# Patient Record
Sex: FEMALE | Race: WHITE | NOT HISPANIC OR LATINO
[De-identification: names, ages, dates, MRNs, and addresses within clinical notes are randomized per-mention and may not be internally consistent; named-entity substitution may affect disease eponyms.]

---

## 2017-02-01 ENCOUNTER — APPOINTMENT (OUTPATIENT)
Dept: PULMONOLOGY | Facility: CLINIC | Age: 57
End: 2017-02-01

## 2017-02-01 VITALS — SYSTOLIC BLOOD PRESSURE: 118 MMHG | OXYGEN SATURATION: 100 % | HEART RATE: 80 BPM | DIASTOLIC BLOOD PRESSURE: 84 MMHG

## 2017-02-01 VITALS — BODY MASS INDEX: 51.58 KG/M2 | WEIGHT: 282 LBS

## 2017-02-01 DIAGNOSIS — R05 COUGH: ICD-10-CM

## 2017-02-01 DIAGNOSIS — G47.30 SLEEP APNEA, UNSPECIFIED: ICD-10-CM

## 2017-02-17 ENCOUNTER — APPOINTMENT (OUTPATIENT)
Dept: DERMATOLOGY | Facility: CLINIC | Age: 57
End: 2017-02-17

## 2017-03-15 ENCOUNTER — RX RENEWAL (OUTPATIENT)
Age: 57
End: 2017-03-15

## 2017-03-15 ENCOUNTER — OTHER (OUTPATIENT)
Age: 57
End: 2017-03-15

## 2017-06-02 ENCOUNTER — APPOINTMENT (OUTPATIENT)
Dept: ORTHOPEDIC SURGERY | Facility: CLINIC | Age: 57
End: 2017-06-02

## 2017-06-07 ENCOUNTER — TRANSCRIPTION ENCOUNTER (OUTPATIENT)
Age: 57
End: 2017-06-07

## 2017-06-12 ENCOUNTER — APPOINTMENT (OUTPATIENT)
Dept: FAMILY MEDICINE | Facility: CLINIC | Age: 57
End: 2017-06-12

## 2017-06-12 VITALS
HEART RATE: 92 BPM | DIASTOLIC BLOOD PRESSURE: 92 MMHG | OXYGEN SATURATION: 98 % | SYSTOLIC BLOOD PRESSURE: 120 MMHG | WEIGHT: 284 LBS | BODY MASS INDEX: 52.26 KG/M2 | TEMPERATURE: 97.9 F | HEIGHT: 62 IN

## 2017-06-12 RX ORDER — DICLOFENAC SODIUM AND MISOPROSTOL 75; 200 MG/1; UG/1
75-0.2 TABLET, DELAYED RELEASE ORAL
Qty: 90 | Refills: 0 | Status: DISCONTINUED | COMMUNITY
Start: 2017-03-15 | End: 2017-06-12

## 2017-06-22 ENCOUNTER — APPOINTMENT (OUTPATIENT)
Dept: FAMILY MEDICINE | Facility: CLINIC | Age: 57
End: 2017-06-22

## 2017-06-22 VITALS
BODY MASS INDEX: 51.71 KG/M2 | HEART RATE: 61 BPM | OXYGEN SATURATION: 99 % | TEMPERATURE: 98.1 F | WEIGHT: 281 LBS | DIASTOLIC BLOOD PRESSURE: 82 MMHG | SYSTOLIC BLOOD PRESSURE: 122 MMHG | HEIGHT: 62 IN

## 2017-06-22 DIAGNOSIS — J30.9 ALLERGIC RHINITIS, UNSPECIFIED: ICD-10-CM

## 2017-06-25 PROBLEM — J30.9 ALLERGIC RHINITIS: Status: ACTIVE | Noted: 2017-06-25

## 2017-07-06 ENCOUNTER — OTHER (OUTPATIENT)
Age: 57
End: 2017-07-06

## 2017-07-13 ENCOUNTER — APPOINTMENT (OUTPATIENT)
Dept: PULMONOLOGY | Facility: CLINIC | Age: 57
End: 2017-07-13

## 2017-07-21 ENCOUNTER — OTHER (OUTPATIENT)
Age: 57
End: 2017-07-21

## 2017-07-25 ENCOUNTER — APPOINTMENT (OUTPATIENT)
Dept: PULMONOLOGY | Facility: CLINIC | Age: 57
End: 2017-07-25

## 2017-08-02 ENCOUNTER — LABORATORY RESULT (OUTPATIENT)
Age: 57
End: 2017-08-02

## 2017-08-02 ENCOUNTER — NON-APPOINTMENT (OUTPATIENT)
Age: 57
End: 2017-08-02

## 2017-08-02 ENCOUNTER — APPOINTMENT (OUTPATIENT)
Dept: FAMILY MEDICINE | Facility: CLINIC | Age: 57
End: 2017-08-02
Payer: COMMERCIAL

## 2017-08-02 VITALS
HEIGHT: 62 IN | BODY MASS INDEX: 52.26 KG/M2 | WEIGHT: 284 LBS | HEART RATE: 88 BPM | SYSTOLIC BLOOD PRESSURE: 124 MMHG | DIASTOLIC BLOOD PRESSURE: 84 MMHG

## 2017-08-02 DIAGNOSIS — Z80.1 FAMILY HISTORY OF MALIGNANT NEOPLASM OF TRACHEA, BRONCHUS AND LUNG: ICD-10-CM

## 2017-08-02 PROCEDURE — 99396 PREV VISIT EST AGE 40-64: CPT | Mod: 25

## 2017-08-02 PROCEDURE — 36415 COLL VENOUS BLD VENIPUNCTURE: CPT

## 2017-08-02 PROCEDURE — 93000 ELECTROCARDIOGRAM COMPLETE: CPT

## 2017-08-09 ENCOUNTER — RESULT REVIEW (OUTPATIENT)
Age: 57
End: 2017-08-09

## 2017-08-09 ENCOUNTER — APPOINTMENT (OUTPATIENT)
Dept: DERMATOLOGY | Facility: CLINIC | Age: 57
End: 2017-08-09
Payer: COMMERCIAL

## 2017-08-09 PROCEDURE — 99212 OFFICE O/P EST SF 10 MIN: CPT | Mod: 25

## 2017-08-09 PROCEDURE — 67810 INCAL BX EYELID SKN LID MRGN: CPT

## 2017-08-29 LAB
25(OH)D3 SERPL-MCNC: 15.5 NG/ML
ALBUMIN SERPL ELPH-MCNC: 4.1 G/DL
ALP BLD-CCNC: 106 U/L
ALT SERPL-CCNC: 12 U/L
ANION GAP SERPL CALC-SCNC: 16 MMOL/L
APPEARANCE: CLEAR
AST SERPL-CCNC: 18 U/L
BASOPHILS # BLD AUTO: 0.01 K/UL
BASOPHILS NFR BLD AUTO: 0.1 %
BILIRUB SERPL-MCNC: 0.4 MG/DL
BILIRUBIN URINE: NEGATIVE
BLOOD URINE: NEGATIVE
BUN SERPL-MCNC: 7 MG/DL
CALCIUM SERPL-MCNC: 9.6 MG/DL
CHLORIDE SERPL-SCNC: 103 MMOL/L
CHOLEST SERPL-MCNC: 196 MG/DL
CHOLEST/HDLC SERPL: 2.5 RATIO
CO2 SERPL-SCNC: 23 MMOL/L
COLOR: YELLOW
CREAT SERPL-MCNC: 0.79 MG/DL
EOSINOPHIL # BLD AUTO: 0.18 K/UL
EOSINOPHIL NFR BLD AUTO: 2.5 %
GLUCOSE QUALITATIVE U: NORMAL MG/DL
GLUCOSE SERPL-MCNC: 89 MG/DL
HCT VFR BLD CALC: 35.4 %
HCV AB SER QL: NONREACTIVE
HCV S/CO RATIO: 0.26 S/CO
HDLC SERPL-MCNC: 80 MG/DL
HGB BLD-MCNC: 10.5 G/DL
HIV1+2 AB SPEC QL IA.RAPID: NONREACTIVE
IMM GRANULOCYTES NFR BLD AUTO: 0.1 %
KETONES URINE: NEGATIVE
LDLC SERPL CALC-MCNC: 101 MG/DL
LEUKOCYTE ESTERASE URINE: ABNORMAL
LYMPHOCYTES # BLD AUTO: 2.13 K/UL
LYMPHOCYTES NFR BLD AUTO: 29.7 %
MAN DIFF?: NORMAL
MCHC RBC-ENTMCNC: 26.2 PG
MCHC RBC-ENTMCNC: 29.7 GM/DL
MCV RBC AUTO: 88.3 FL
MONOCYTES # BLD AUTO: 0.5 K/UL
MONOCYTES NFR BLD AUTO: 7 %
NEUTROPHILS # BLD AUTO: 4.35 K/UL
NEUTROPHILS NFR BLD AUTO: 60.6 %
NITRITE URINE: NEGATIVE
PH URINE: 7
PLATELET # BLD AUTO: 365 K/UL
POTASSIUM SERPL-SCNC: 4.4 MMOL/L
PROT SERPL-MCNC: 7.9 G/DL
PROTEIN URINE: NEGATIVE MG/DL
RBC # BLD: 4.01 M/UL
RBC # FLD: 19.3 %
SODIUM SERPL-SCNC: 142 MMOL/L
SPECIFIC GRAVITY URINE: 1
T4 FREE SERPL-MCNC: 1.2 NG/DL
TRIGL SERPL-MCNC: 74 MG/DL
TSH SERPL-ACNC: 1.65 UIU/ML
UROBILINOGEN URINE: NORMAL MG/DL
VIT B12 SERPL-MCNC: 773 PG/ML
WBC # FLD AUTO: 7.18 K/UL

## 2017-09-02 ENCOUNTER — LABORATORY RESULT (OUTPATIENT)
Age: 57
End: 2017-09-02

## 2017-09-05 LAB — HEMOCCULT STL QL IA: NEGATIVE

## 2017-09-06 ENCOUNTER — APPOINTMENT (OUTPATIENT)
Dept: FAMILY MEDICINE | Facility: CLINIC | Age: 57
End: 2017-09-06
Payer: COMMERCIAL

## 2017-09-06 VITALS
BODY MASS INDEX: 53.18 KG/M2 | WEIGHT: 289 LBS | SYSTOLIC BLOOD PRESSURE: 118 MMHG | HEART RATE: 68 BPM | DIASTOLIC BLOOD PRESSURE: 82 MMHG | HEIGHT: 62 IN

## 2017-09-06 PROCEDURE — 99214 OFFICE O/P EST MOD 30 MIN: CPT | Mod: 25

## 2017-09-06 PROCEDURE — 36415 COLL VENOUS BLD VENIPUNCTURE: CPT

## 2017-09-06 PROCEDURE — 90715 TDAP VACCINE 7 YRS/> IM: CPT

## 2017-09-06 PROCEDURE — 90471 IMMUNIZATION ADMIN: CPT

## 2017-09-08 ENCOUNTER — OTHER (OUTPATIENT)
Age: 57
End: 2017-09-08

## 2017-09-08 LAB
FERRITIN SERPL-MCNC: 12 NG/ML
HGB A MFR BLD: 97.7 %
HGB A2 MFR BLD: 2.3 %
HGB FRACT BLD-IMP: NORMAL
IRON SATN MFR SERPL: 7 %
IRON SERPL-MCNC: 27 UG/DL
TIBC SERPL-MCNC: 373 UG/DL
UIBC SERPL-MCNC: 346 UG/DL

## 2017-10-29 ENCOUNTER — RX RENEWAL (OUTPATIENT)
Age: 57
End: 2017-10-29

## 2018-02-14 ENCOUNTER — APPOINTMENT (OUTPATIENT)
Dept: DERMATOLOGY | Facility: CLINIC | Age: 58
End: 2018-02-14
Payer: COMMERCIAL

## 2018-02-14 PROCEDURE — 99213 OFFICE O/P EST LOW 20 MIN: CPT

## 2018-03-28 ENCOUNTER — APPOINTMENT (OUTPATIENT)
Dept: ORTHOPEDIC SURGERY | Facility: CLINIC | Age: 58
End: 2018-03-28
Payer: COMMERCIAL

## 2018-03-28 VITALS
DIASTOLIC BLOOD PRESSURE: 85 MMHG | WEIGHT: 289 LBS | HEART RATE: 98 BPM | HEIGHT: 62 IN | SYSTOLIC BLOOD PRESSURE: 147 MMHG | BODY MASS INDEX: 53.18 KG/M2

## 2018-03-28 PROCEDURE — 73562 X-RAY EXAM OF KNEE 3: CPT | Mod: LT

## 2018-03-28 PROCEDURE — 99214 OFFICE O/P EST MOD 30 MIN: CPT

## 2018-06-18 ENCOUNTER — APPOINTMENT (OUTPATIENT)
Dept: FAMILY MEDICINE | Facility: CLINIC | Age: 58
End: 2018-06-18
Payer: MEDICAID

## 2018-06-18 VITALS
DIASTOLIC BLOOD PRESSURE: 80 MMHG | WEIGHT: 289 LBS | HEIGHT: 62 IN | SYSTOLIC BLOOD PRESSURE: 118 MMHG | HEART RATE: 98 BPM | BODY MASS INDEX: 53.18 KG/M2

## 2018-06-18 DIAGNOSIS — D64.9 ANEMIA, UNSPECIFIED: ICD-10-CM

## 2018-06-18 DIAGNOSIS — L98.9 DISORDER OF THE SKIN AND SUBCUTANEOUS TISSUE, UNSPECIFIED: ICD-10-CM

## 2018-06-18 PROCEDURE — 99214 OFFICE O/P EST MOD 30 MIN: CPT

## 2018-06-18 RX ORDER — ERGOCALCIFEROL 1.25 MG/1
1.25 MG CAPSULE, LIQUID FILLED ORAL
Qty: 8 | Refills: 0 | Status: DISCONTINUED | COMMUNITY
Start: 2017-09-06 | End: 2018-06-18

## 2018-06-18 NOTE — REVIEW OF SYSTEMS
[Itching] : Itching [Fever] : no fever [Chills] : no chills [Chest Pain] : no chest pain [Palpitations] : no palpitations [Shortness Of Breath] : no shortness of breath [Cough] : no cough [Abdominal Pain] : no abdominal pain [Nausea] : no nausea [Diarrhea] : diarrhea [Vomiting] : no vomiting [Melena] : no melena [Dysuria] : no dysuria [Hematuria] : no hematuria [Mole Changes] : no mole changes [Headache] : no headache [Dizziness] : no dizziness [Fainting] : no fainting

## 2018-06-18 NOTE — HISTORY OF PRESENT ILLNESS
[FreeTextEntry8] : PRESENTING FOR ACUTE VISIT.  WENT TO URGENT CARE 10 DAYS AGO FOR BLISTERS ON HANDS AND FOREARMS.  WAS ITCHING.  NO PAIN.  GIVEN TRIAMCINOLONE CREAM AND PREDNISONE TABS FOR FOUR DAYS.  NO GARDENING OR PLANT EXPOSURE.  NO NEW FOODS OR LOTIONS.  NO SICK CONTACTS.  SYMPTOMS RESOLVED AND THEN ANOTHER SPOT ON SATURDAY LEFT MIDDLE FINGER AND RING FINGER.  NOW ALSO ON LEFT CHEEK.  HAS APPOINTMENT WITH DERMATOLOGY TOMORROW.  NO FEVER.  HAS CLEAR DISCHARGE FROM ONE SPOT ON FINGER.  IS TO SEE GYN NEXT WEEK.  HISTORY OF GALLBLADDER POLYP - DUE FOR REPEAT ABDOMINAL SONOGRAM.  HAS HAD NO ABDOMINAL PAIN OR N/V/D.  NO BLOOD IN STOOL.  TAKING B12 AND VITAMIN D SUPPLEMENTS FOR VITAMIN B12 DEFICIENCY AND VITAMIN D INSUFFICIENCY AND IRON FOR PRIOR ANEMIA.

## 2018-06-18 NOTE — PHYSICAL EXAM
[No Acute Distress] : no acute distress [Well Nourished] : well nourished [Well-Appearing] : well-appearing [Supple] : supple [No Lymphadenopathy] : no lymphadenopathy [No Respiratory Distress] : no respiratory distress  [Clear to Auscultation] : lungs were clear to auscultation bilaterally [No Accessory Muscle Use] : no accessory muscle use [Normal Rate] : normal rate  [Regular Rhythm] : with a regular rhythm [Normal S1, S2] : normal S1 and S2 [No Murmur] : no murmur heard [Soft] : abdomen soft [Non Tender] : non-tender [Normal Bowel Sounds] : normal bowel sounds [Normal Gait] : normal gait [No Focal Deficits] : no focal deficits [Acne] : no acne [de-identified] : LEFT MIDDLE FINGER WITH SMALL BLISTER LIKE LESION, SCABBING NOTED ON LEFT RING FINGER AND BACK.  INFLAMMATION TO A FEW SPOTS ON LEFT CHEEK ? SCRATCHED

## 2018-06-18 NOTE — PLAN
[FreeTextEntry1] : KEEP APPOINTMENT WITH DERMATOLOGY TOMORROW MORNING\par KEEP SKIN CLEAN AND DRY\par TRY TO AVOID SCRATCHING\par CONTINUE WITH TRIAMCINOLONE AT THIS TIME\par MONITOR FOR POTENTIAL TRIGGERS OF SYMPTOMS\par RX FOR NEXT ABDOMINAL SONOGRAM IN FOLLOW-UP OF GALLBLADDER POLYP\par CHECK LAB WORK INCLUDING CBC, IRON LEVELS, B12 AND D\par FOLLOW-UP ALL SPECIALISTS AS DIRECTED \par CALL WITH ANY QUESTIONS, CONCERNS OR CHANGES

## 2018-06-19 ENCOUNTER — APPOINTMENT (OUTPATIENT)
Dept: DERMATOLOGY | Facility: CLINIC | Age: 58
End: 2018-06-19
Payer: COMMERCIAL

## 2018-06-19 PROCEDURE — 99213 OFFICE O/P EST LOW 20 MIN: CPT

## 2018-06-28 LAB
25(OH)D3 SERPL-MCNC: 26.8 NG/ML
BASOPHILS # BLD AUTO: 0.02 K/UL
BASOPHILS NFR BLD AUTO: 0.2 %
EOSINOPHIL # BLD AUTO: 0.15 K/UL
EOSINOPHIL NFR BLD AUTO: 1.7 %
FERRITIN SERPL-MCNC: 29 NG/ML
HCT VFR BLD CALC: 38.5 %
HGB BLD-MCNC: 11.9 G/DL
IMM GRANULOCYTES NFR BLD AUTO: 0.2 %
IRON SATN MFR SERPL: 17 %
IRON SERPL-MCNC: 63 UG/DL
LYMPHOCYTES # BLD AUTO: 2.05 K/UL
LYMPHOCYTES NFR BLD AUTO: 23.6 %
MAN DIFF?: NORMAL
MCHC RBC-ENTMCNC: 28.4 PG
MCHC RBC-ENTMCNC: 30.9 GM/DL
MCV RBC AUTO: 91.9 FL
MONOCYTES # BLD AUTO: 0.62 K/UL
MONOCYTES NFR BLD AUTO: 7.2 %
NEUTROPHILS # BLD AUTO: 5.81 K/UL
NEUTROPHILS NFR BLD AUTO: 67.1 %
PLATELET # BLD AUTO: 330 K/UL
RBC # BLD: 4.19 M/UL
RBC # FLD: 17.3 %
TIBC SERPL-MCNC: 365 UG/DL
UIBC SERPL-MCNC: 302 UG/DL
VIT B12 SERPL-MCNC: 1231 PG/ML
WBC # FLD AUTO: 8.67 K/UL

## 2018-07-27 ENCOUNTER — RESULT REVIEW (OUTPATIENT)
Age: 58
End: 2018-07-27

## 2018-08-01 ENCOUNTER — RESULT REVIEW (OUTPATIENT)
Age: 58
End: 2018-08-01

## 2018-08-01 ENCOUNTER — APPOINTMENT (OUTPATIENT)
Dept: DERMATOLOGY | Facility: CLINIC | Age: 58
End: 2018-08-01
Payer: MEDICAID

## 2018-08-01 PROCEDURE — 99213 OFFICE O/P EST LOW 20 MIN: CPT | Mod: 25

## 2018-08-01 PROCEDURE — 11100 BX SKIN SUBCUTANEOUS&/MUCOUS MEMBRANE 1 LESION: CPT

## 2018-08-13 ENCOUNTER — APPOINTMENT (OUTPATIENT)
Dept: DERMATOLOGY | Facility: CLINIC | Age: 58
End: 2018-08-13
Payer: MEDICAID

## 2018-08-13 PROCEDURE — 99213 OFFICE O/P EST LOW 20 MIN: CPT

## 2018-08-20 ENCOUNTER — APPOINTMENT (OUTPATIENT)
Dept: FAMILY MEDICINE | Facility: CLINIC | Age: 58
End: 2018-08-20
Payer: MEDICAID

## 2018-08-20 ENCOUNTER — NON-APPOINTMENT (OUTPATIENT)
Age: 58
End: 2018-08-20

## 2018-08-20 ENCOUNTER — APPOINTMENT (OUTPATIENT)
Dept: PULMONOLOGY | Facility: CLINIC | Age: 58
End: 2018-08-20
Payer: MEDICAID

## 2018-08-20 VITALS
WEIGHT: 289 LBS | SYSTOLIC BLOOD PRESSURE: 120 MMHG | BODY MASS INDEX: 53.18 KG/M2 | HEIGHT: 62 IN | HEART RATE: 117 BPM | DIASTOLIC BLOOD PRESSURE: 70 MMHG | OXYGEN SATURATION: 96 %

## 2018-08-20 VITALS
BODY MASS INDEX: 53.18 KG/M2 | WEIGHT: 289 LBS | DIASTOLIC BLOOD PRESSURE: 70 MMHG | SYSTOLIC BLOOD PRESSURE: 120 MMHG | HEART RATE: 84 BPM | HEIGHT: 62 IN

## 2018-08-20 DIAGNOSIS — Z23 ENCOUNTER FOR IMMUNIZATION: ICD-10-CM

## 2018-08-20 DIAGNOSIS — Z96.652 AFTERCARE FOLLOWING JOINT REPLACEMENT SURGERY: ICD-10-CM

## 2018-08-20 DIAGNOSIS — K76.0 FATTY (CHANGE OF) LIVER, NOT ELSEWHERE CLASSIFIED: ICD-10-CM

## 2018-08-20 DIAGNOSIS — Z47.1 AFTERCARE FOLLOWING JOINT REPLACEMENT SURGERY: ICD-10-CM

## 2018-08-20 DIAGNOSIS — M25.473 EFFUSION, UNSPECIFIED ANKLE: ICD-10-CM

## 2018-08-20 DIAGNOSIS — S61.432A PUNCTURE WOUND W/OUT FOREIGN BODY OF LEFT HAND, INITIAL ENCOUNTER: ICD-10-CM

## 2018-08-20 DIAGNOSIS — Z87.09 PERSONAL HISTORY OF OTHER DISEASES OF THE RESPIRATORY SYSTEM: ICD-10-CM

## 2018-08-20 PROCEDURE — 99396 PREV VISIT EST AGE 40-64: CPT | Mod: 25

## 2018-08-20 PROCEDURE — 93000 ELECTROCARDIOGRAM COMPLETE: CPT

## 2018-08-20 PROCEDURE — 99214 OFFICE O/P EST MOD 30 MIN: CPT

## 2018-08-20 NOTE — PLAN
[FreeTextEntry1] : HEALTHY DIET AND LIFESTYLE MODIFICATIONS\par HEALTHY WEIGHT LOSS \par VISION SCREENING\par FLU VACCINE IN THE FALL RECOMMENDED\par DISCUSSED SHINGLES VACCINE\par CHECK ROUTINE LAB WORK\par STOOL OCCULT\par UP TO DATE WITH GYN, MAMMOGRAM AND COLONOSCOPY\par FOLLOW-UP ALL SPECIALISTS AS DIRECTED INCLUDING DERMATOLOGY\par EKG: NSR AT 64 BPM, NO ACUTE ST-T WAVE CHANGES, NO SIGNIFICANT CHANGE FROM PRIOR\par CALL WITH ANY QUESTIONS, CONCERNS OR CHANGES

## 2018-08-20 NOTE — HISTORY OF PRESENT ILLNESS
[FreeTextEntry1] : WELLNESS EXAM [de-identified] : PRESENTING FOR YEARLY WELLNESS EXAM.  DIET HAS IMPROVED.  PORTIONS AND CHOICES BETTER.  NOT ROUTINELY EXERCISING.  WOULD LIKE TO LOOSE WEIGHT.  \par DERMATOLOGY: DR. WALLACE/DARRELL.  BEING EVALUATED FOR DERMATITIS OF SKIN.  ORIGINALLY PUT ON PREDNISONE BY DR. RAMÍREZ.  RECURRED AND SAW DR. WOODALL.  GIVEN MULTIPLE LOTIONS.  HAS FOLLOW-UP APPOINTMENT ON SATURDAY.  ADVISED POSSIBLE SCABIES.  HAD SKIN BIOPSY AND DISCUSSED ADDITIONAL SKIN TESTING.\par PULMONOLOGY: DR. WREN - TO FOLLOW-UP TODAY.  ON CPAP\par GYN: DR. DONATO\par GI: DR. RIVERA\par OPHTHALMOLOGY: DR. FRANCE\par CARDIOLOGY: DR. VEE\par ORTHOPEDICS: DR. GONZALEZ\par RHEUMATOLOGY: DR. SIMMS\par  HAS HAD NO HEADACHE, DIZZINESS, CHEST PAIN, SHORTNESS OF BREATH, GI OR URINARY COMPLAINTS.

## 2018-08-20 NOTE — REVIEW OF SYSTEMS
[Joint Pain] : joint pain [Itching] : Itching [Fever] : no fever [Chills] : no chills [Fatigue] : no fatigue [Discharge] : no discharge [Pain] : no pain [Earache] : no earache [Nasal Discharge] : no nasal discharge [Sore Throat] : no sore throat [Chest Pain] : no chest pain [Palpitations] : no palpitations [Shortness Of Breath] : no shortness of breath [Cough] : no cough [Abdominal Pain] : no abdominal pain [Nausea] : no nausea [Diarrhea] : diarrhea [Vomiting] : no vomiting [Melena] : no melena [Dysuria] : no dysuria [Hematuria] : no hematuria [Muscle Pain] : no muscle pain [Headache] : no headache [Dizziness] : no dizziness [Fainting] : no fainting [Suicidal] : not suicidal [Depression] : no depression [Easy Bleeding] : no easy bleeding [Easy Bruising] : no easy bruising

## 2018-08-20 NOTE — HEALTH RISK ASSESSMENT
[Patient reported colonoscopy was normal] : Patient reported colonoscopy was normal [Excellent] : ~his/her~ current health as excellent [Good] : ~his/her~  mood as  good [0] : 2) Feeling down, depressed, or hopeless: Not at all (0) [None] : None [Alone] : lives alone [Employed] : employed [College] : College [Single] : single [Feels Safe at Home] : Feels safe at home [Fully functional (bathing, dressing, toileting, transferring, walking, feeding)] : Fully functional (bathing, dressing, toileting, transferring, walking, feeding) [Fully functional (using the telephone, shopping, preparing meals, housekeeping, doing laundry, using] : Fully functional and needs no help or supervision to perform IADLs (using the telephone, shopping, preparing meals, housekeeping, doing laundry, using transportation, managing medications and managing finances) [Smoke Detector] : smoke detector [Carbon Monoxide Detector] : carbon monoxide detector [Safety elements used in home] : safety elements used in home [Seat Belt] :  uses seat belt [Sunscreen] : uses sunscreen [Discussed at today's visit] : Advance Directives Discussed at today's visit [] : No [de-identified] : SOCIALLY [[id number]] : 0 [Change in mental status noted] : No change in mental status noted [Language] : denies difficulty with language [Learning/Retaining New Information] : denies difficulty learning/retaining new information [Handling Complex Tasks] : denies difficulty handling complex tasks [High Risk Behavior] : no high risk behavior [Reports changes in hearing] : Reports no changes in hearing [Reports changes in vision] : Reports no changes in vision [Reports changes in dental health] : Reports no changes in dental health [MammogramDate] : 07/18 [PapSmearDate] : 06/18 [BoneDensityDate] : 02/14 [BoneDensityComments] : OSTEOPENIA - DR. SIMMS [ColonoscopyDate] : 02/16 [ColonoscopyComments] : THREE YEAR FOLLOW-UP ADVISED [HIVDate] : 08/17 [HepatitisCDate] : 08/17 [de-identified] : GLASSES FOR DISTANCE AND READING [de-identified] : SEES DENTIST ROUTINELY

## 2018-08-20 NOTE — PHYSICAL EXAM
[No Acute Distress] : no acute distress [Well Nourished] : well nourished [Well-Appearing] : well-appearing [Normal Sclera/Conjunctiva] : normal sclera/conjunctiva [PERRL] : pupils equal round and reactive to light [EOMI] : extraocular movements intact [Normal Outer Ear/Nose] : the outer ears and nose were normal in appearance [Normal Oropharynx] : the oropharynx was normal [Normal TMs] : both tympanic membranes were normal [Supple] : supple [No Lymphadenopathy] : no lymphadenopathy [No Respiratory Distress] : no respiratory distress  [Clear to Auscultation] : lungs were clear to auscultation bilaterally [No Accessory Muscle Use] : no accessory muscle use [Normal Rate] : normal rate  [Regular Rhythm] : with a regular rhythm [Normal S1, S2] : normal S1 and S2 [No Murmur] : no murmur heard [Soft] : abdomen soft [Non Tender] : non-tender [Normal Bowel Sounds] : normal bowel sounds [No Joint Swelling] : no joint swelling [Grossly Normal Strength/Tone] : grossly normal strength/tone [Normal Gait] : normal gait [Coordination Grossly Intact] : coordination grossly intact [No Focal Deficits] : no focal deficits [Normal Affect] : the affect was normal [Normal Mood] : the mood was normal [Normal Insight/Judgement] : insight and judgment were intact [Acne] : no acne [de-identified] : UPPER ARMS, ABDOMEN AND BACK WITH EXCORIATIONS AND ERYTHEMATOUS PAPULES SCATTERED

## 2018-08-25 ENCOUNTER — APPOINTMENT (OUTPATIENT)
Dept: DERMATOLOGY | Facility: CLINIC | Age: 58
End: 2018-08-25
Payer: MEDICAID

## 2018-08-25 PROCEDURE — 99214 OFFICE O/P EST MOD 30 MIN: CPT

## 2018-08-28 ENCOUNTER — APPOINTMENT (OUTPATIENT)
Dept: DERMATOLOGY | Facility: CLINIC | Age: 58
End: 2018-08-28

## 2018-08-30 ENCOUNTER — APPOINTMENT (OUTPATIENT)
Dept: DERMATOLOGY | Facility: CLINIC | Age: 58
End: 2018-08-30

## 2018-08-31 ENCOUNTER — LABORATORY RESULT (OUTPATIENT)
Age: 58
End: 2018-08-31

## 2018-09-01 ENCOUNTER — APPOINTMENT (OUTPATIENT)
Dept: DERMATOLOGY | Facility: CLINIC | Age: 58
End: 2018-09-01

## 2018-09-04 ENCOUNTER — TRANSCRIPTION ENCOUNTER (OUTPATIENT)
Age: 58
End: 2018-09-04

## 2018-09-04 LAB
ALBUMIN SERPL ELPH-MCNC: 3.8 G/DL
ALP BLD-CCNC: 89 U/L
ALT SERPL-CCNC: 16 U/L
ANION GAP SERPL CALC-SCNC: 12 MMOL/L
APPEARANCE: CLEAR
AST SERPL-CCNC: 13 U/L
BILIRUB SERPL-MCNC: 0.3 MG/DL
BILIRUBIN URINE: NEGATIVE
BLOOD URINE: NEGATIVE
BUN SERPL-MCNC: 8 MG/DL
CALCIUM SERPL-MCNC: 10.2 MG/DL
CHLORIDE SERPL-SCNC: 102 MMOL/L
CHOLEST SERPL-MCNC: 221 MG/DL
CHOLEST/HDLC SERPL: 2.5 RATIO
CO2 SERPL-SCNC: 30 MMOL/L
COLOR: YELLOW
CREAT SERPL-MCNC: 0.85 MG/DL
GLUCOSE QUALITATIVE U: NEGATIVE MG/DL
GLUCOSE SERPL-MCNC: 78 MG/DL
HBA1C MFR BLD HPLC: 5.6 %
HDLC SERPL-MCNC: 90 MG/DL
KETONES URINE: NEGATIVE
LDLC SERPL CALC-MCNC: 100 MG/DL
LEUKOCYTE ESTERASE URINE: ABNORMAL
NITRITE URINE: NEGATIVE
PH URINE: 6.5
POTASSIUM SERPL-SCNC: 4.3 MMOL/L
PROT SERPL-MCNC: 7.1 G/DL
PROTEIN URINE: NEGATIVE MG/DL
SODIUM SERPL-SCNC: 144 MMOL/L
SPECIFIC GRAVITY URINE: 1.01
T4 FREE SERPL-MCNC: 1.3 NG/DL
TRIGL SERPL-MCNC: 155 MG/DL
TSH SERPL-ACNC: 2.53 UIU/ML
UROBILINOGEN URINE: NEGATIVE MG/DL

## 2018-09-06 ENCOUNTER — TRANSCRIPTION ENCOUNTER (OUTPATIENT)
Age: 58
End: 2018-09-06

## 2018-09-08 ENCOUNTER — APPOINTMENT (OUTPATIENT)
Dept: DERMATOLOGY | Facility: CLINIC | Age: 58
End: 2018-09-08
Payer: MEDICAID

## 2018-09-08 ENCOUNTER — APPOINTMENT (OUTPATIENT)
Dept: DERMATOLOGY | Facility: CLINIC | Age: 58
End: 2018-09-08

## 2018-09-08 ENCOUNTER — RESULT REVIEW (OUTPATIENT)
Age: 58
End: 2018-09-08

## 2018-09-08 PROCEDURE — 11100 BX SKIN SUBCUTANEOUS&/MUCOUS MEMBRANE 1 LESION: CPT

## 2018-09-10 ENCOUNTER — TRANSCRIPTION ENCOUNTER (OUTPATIENT)
Age: 58
End: 2018-09-10

## 2018-09-11 ENCOUNTER — TRANSCRIPTION ENCOUNTER (OUTPATIENT)
Age: 58
End: 2018-09-11

## 2018-09-11 ENCOUNTER — APPOINTMENT (OUTPATIENT)
Dept: DERMATOLOGY | Facility: CLINIC | Age: 58
End: 2018-09-11
Payer: MEDICAID

## 2018-09-11 VITALS
BODY MASS INDEX: 52.44 KG/M2 | HEIGHT: 62 IN | WEIGHT: 285 LBS | SYSTOLIC BLOOD PRESSURE: 110 MMHG | DIASTOLIC BLOOD PRESSURE: 72 MMHG

## 2018-09-11 DIAGNOSIS — F41.8 OTHER SPECIFIED ANXIETY DISORDERS: ICD-10-CM

## 2018-09-11 DIAGNOSIS — Z87.39 PERSONAL HISTORY OF OTHER DISEASES OF THE MUSCULOSKELETAL SYSTEM AND CONNECTIVE TISSUE: ICD-10-CM

## 2018-09-11 DIAGNOSIS — Z80.8 FAMILY HISTORY OF MALIGNANT NEOPLASM OF OTHER ORGANS OR SYSTEMS: ICD-10-CM

## 2018-09-11 DIAGNOSIS — Z87.898 PERSONAL HISTORY OF OTHER SPECIFIED CONDITIONS: ICD-10-CM

## 2018-09-11 PROCEDURE — 99213 OFFICE O/P EST LOW 20 MIN: CPT

## 2018-09-12 ENCOUNTER — TRANSCRIPTION ENCOUNTER (OUTPATIENT)
Age: 58
End: 2018-09-12

## 2018-09-19 ENCOUNTER — APPOINTMENT (OUTPATIENT)
Dept: DERMATOLOGY | Facility: CLINIC | Age: 58
End: 2018-09-19
Payer: MEDICAID

## 2018-09-19 ENCOUNTER — TRANSCRIPTION ENCOUNTER (OUTPATIENT)
Age: 58
End: 2018-09-19

## 2018-09-19 ENCOUNTER — LABORATORY RESULT (OUTPATIENT)
Age: 58
End: 2018-09-19

## 2018-09-19 VITALS
DIASTOLIC BLOOD PRESSURE: 80 MMHG | WEIGHT: 285 LBS | BODY MASS INDEX: 52.44 KG/M2 | HEIGHT: 62 IN | SYSTOLIC BLOOD PRESSURE: 110 MMHG

## 2018-09-19 DIAGNOSIS — R76.8 OTHER SPECIFIED ABNORMAL IMMUNOLOGICAL FINDINGS IN SERUM: ICD-10-CM

## 2018-09-19 DIAGNOSIS — D48.5 NEOPLASM OF UNCERTAIN BEHAVIOR OF SKIN: ICD-10-CM

## 2018-09-19 PROCEDURE — 99213 OFFICE O/P EST LOW 20 MIN: CPT | Mod: 25

## 2018-09-19 PROCEDURE — 11300 SHAVE SKIN LESION 0.5 CM/<: CPT

## 2018-09-20 ENCOUNTER — TRANSCRIPTION ENCOUNTER (OUTPATIENT)
Age: 58
End: 2018-09-20

## 2018-09-20 ENCOUNTER — LABORATORY RESULT (OUTPATIENT)
Age: 58
End: 2018-09-20

## 2018-09-21 LAB
ANA PAT FLD IF-IMP: ABNORMAL
ANA SER IF-ACNC: ABNORMAL
DSDNA AB SER-ACNC: <12 IU/ML

## 2018-09-27 LAB
DESMOGLEIN 1 AB SER-ACNC: 1.92 U
DESMOGLEIN 3 AB SER-ACNC: 0.79 U
MISCELLANEOUS TEST: NORMAL
PROC NAME: NORMAL

## 2018-10-02 ENCOUNTER — TRANSCRIPTION ENCOUNTER (OUTPATIENT)
Age: 58
End: 2018-10-02

## 2018-10-02 LAB — HEMOCCULT STL QL IA: NEGATIVE

## 2018-10-03 ENCOUNTER — TRANSCRIPTION ENCOUNTER (OUTPATIENT)
Age: 58
End: 2018-10-03

## 2018-10-11 ENCOUNTER — APPOINTMENT (OUTPATIENT)
Dept: RHEUMATOLOGY | Facility: CLINIC | Age: 58
End: 2018-10-11
Payer: MEDICAID

## 2018-10-11 VITALS
HEART RATE: 102 BPM | HEIGHT: 62 IN | WEIGHT: 285 LBS | OXYGEN SATURATION: 97 % | TEMPERATURE: 97.3 F | DIASTOLIC BLOOD PRESSURE: 79 MMHG | BODY MASS INDEX: 52.44 KG/M2 | SYSTOLIC BLOOD PRESSURE: 116 MMHG

## 2018-10-11 PROCEDURE — 99204 OFFICE O/P NEW MOD 45 MIN: CPT

## 2018-10-12 LAB
25(OH)D3 SERPL-MCNC: 23.1 NG/ML
APTT BLD: 25.6 SEC
B2 GLYCOPROT1 AB SER QL: NEGATIVE
C3 SERPL-MCNC: 179 MG/DL
C4 SERPL-MCNC: 34 MG/DL
CARDIOLIPIN AB SER IA-ACNC: NEGATIVE
INR PPP: 0.98 RATIO
PT BLD: 11.1 SEC
THYROGLOB AB SERPL-ACNC: <20 IU/ML
THYROPEROXIDASE AB SERPL IA-ACNC: <10 IU/ML

## 2018-10-13 LAB
B2 GLYCOPROT1 IGA SERPL IA-ACNC: <5 SAU
ENA SS-A AB SER IA-ACNC: <0.2 AL
ENA SS-B AB SER IA-ACNC: <0.2 AL

## 2018-10-14 LAB — G6PD SER-CCNC: 16.7 U/G HGB

## 2018-10-16 ENCOUNTER — APPOINTMENT (OUTPATIENT)
Dept: DERMATOLOGY | Facility: CLINIC | Age: 58
End: 2018-10-16

## 2018-10-19 ENCOUNTER — TRANSCRIPTION ENCOUNTER (OUTPATIENT)
Age: 58
End: 2018-10-19

## 2018-10-23 ENCOUNTER — MEDICATION RENEWAL (OUTPATIENT)
Age: 58
End: 2018-10-23

## 2018-10-23 ENCOUNTER — TRANSCRIPTION ENCOUNTER (OUTPATIENT)
Age: 58
End: 2018-10-23

## 2018-10-23 ENCOUNTER — APPOINTMENT (OUTPATIENT)
Dept: PULMONOLOGY | Facility: CLINIC | Age: 58
End: 2018-10-23

## 2018-10-30 ENCOUNTER — APPOINTMENT (OUTPATIENT)
Dept: DERMATOLOGY | Facility: CLINIC | Age: 58
End: 2018-10-30
Payer: MEDICAID

## 2018-10-30 VITALS
HEIGHT: 62 IN | WEIGHT: 285 LBS | DIASTOLIC BLOOD PRESSURE: 70 MMHG | BODY MASS INDEX: 52.44 KG/M2 | TEMPERATURE: 97.7 F | SYSTOLIC BLOOD PRESSURE: 106 MMHG

## 2018-10-30 DIAGNOSIS — Z82.61 FAMILY HISTORY OF ARTHRITIS: ICD-10-CM

## 2018-10-30 PROCEDURE — 99213 OFFICE O/P EST LOW 20 MIN: CPT

## 2018-10-31 PROBLEM — Z82.61 FAMILY HISTORY OF ARTHRITIS: Status: ACTIVE | Noted: 2018-10-11

## 2018-11-16 ENCOUNTER — APPOINTMENT (OUTPATIENT)
Dept: DERMATOLOGY | Facility: CLINIC | Age: 58
End: 2018-11-16
Payer: MEDICAID

## 2018-11-16 VITALS — BODY MASS INDEX: 52.44 KG/M2 | WEIGHT: 285 LBS | HEIGHT: 62 IN

## 2018-11-16 PROCEDURE — 99214 OFFICE O/P EST MOD 30 MIN: CPT | Mod: GC

## 2018-11-16 RX ORDER — GLUC/MSM/COLGN2/HYAL/ANTIARTH3 375-375-20
TABLET ORAL
Refills: 0 | Status: COMPLETED | COMMUNITY

## 2018-11-16 RX ORDER — PREDNISONE 10 MG/1
10 TABLET ORAL
Refills: 0 | Status: COMPLETED | COMMUNITY

## 2018-11-26 ENCOUNTER — APPOINTMENT (OUTPATIENT)
Dept: RHEUMATOLOGY | Facility: CLINIC | Age: 58
End: 2018-11-26
Payer: COMMERCIAL

## 2018-11-26 VITALS
DIASTOLIC BLOOD PRESSURE: 84 MMHG | HEART RATE: 103 BPM | RESPIRATION RATE: 16 BRPM | WEIGHT: 285 LBS | HEIGHT: 62 IN | BODY MASS INDEX: 52.44 KG/M2 | SYSTOLIC BLOOD PRESSURE: 138 MMHG | OXYGEN SATURATION: 98 %

## 2018-11-26 DIAGNOSIS — M17.10 UNILATERAL PRIMARY OSTEOARTHRITIS, UNSPECIFIED KNEE: ICD-10-CM

## 2018-11-26 PROCEDURE — 99213 OFFICE O/P EST LOW 20 MIN: CPT

## 2018-12-05 ENCOUNTER — APPOINTMENT (OUTPATIENT)
Dept: FAMILY MEDICINE | Facility: CLINIC | Age: 58
End: 2018-12-05
Payer: COMMERCIAL

## 2018-12-05 VITALS
SYSTOLIC BLOOD PRESSURE: 120 MMHG | WEIGHT: 288 LBS | HEIGHT: 62 IN | HEART RATE: 80 BPM | DIASTOLIC BLOOD PRESSURE: 70 MMHG | BODY MASS INDEX: 53 KG/M2

## 2018-12-05 DIAGNOSIS — E66.9 OBESITY, UNSPECIFIED: ICD-10-CM

## 2018-12-05 PROCEDURE — 99214 OFFICE O/P EST MOD 30 MIN: CPT

## 2018-12-05 RX ORDER — PREDNISONE 10 MG/1
10 TABLET ORAL
Qty: 120 | Refills: 1 | Status: DISCONTINUED | COMMUNITY
Start: 2018-11-16 | End: 2018-12-05

## 2018-12-05 NOTE — PHYSICAL EXAM
[No Acute Distress] : no acute distress [Well Nourished] : well nourished [Well-Appearing] : well-appearing [Normal Sclera/Conjunctiva] : normal sclera/conjunctiva [PERRL] : pupils equal round and reactive to light [Supple] : supple [No Lymphadenopathy] : no lymphadenopathy [No Respiratory Distress] : no respiratory distress  [Clear to Auscultation] : lungs were clear to auscultation bilaterally [No Accessory Muscle Use] : no accessory muscle use [Normal Rate] : normal rate  [Regular Rhythm] : with a regular rhythm [Normal S1, S2] : normal S1 and S2 [No Murmur] : no murmur heard [Soft] : abdomen soft [Non Tender] : non-tender [Normal Bowel Sounds] : normal bowel sounds [Speech Grossly Normal] : speech grossly normal [Normal Mood] : the mood was normal [Normal Insight/Judgement] : insight and judgment were intact

## 2018-12-07 ENCOUNTER — APPOINTMENT (OUTPATIENT)
Dept: PULMONOLOGY | Facility: CLINIC | Age: 58
End: 2018-12-07
Payer: COMMERCIAL

## 2018-12-07 VITALS — OXYGEN SATURATION: 98 % | HEART RATE: 92 BPM

## 2018-12-07 VITALS — DIASTOLIC BLOOD PRESSURE: 66 MMHG | BODY MASS INDEX: 52.31 KG/M2 | SYSTOLIC BLOOD PRESSURE: 128 MMHG | WEIGHT: 286 LBS

## 2018-12-07 PROCEDURE — 99214 OFFICE O/P EST MOD 30 MIN: CPT | Mod: 25

## 2018-12-12 LAB
BASOPHILS # BLD AUTO: 0.01 K/UL
BASOPHILS NFR BLD AUTO: 0.1 %
EOSINOPHIL # BLD AUTO: 0.2 K/UL
EOSINOPHIL NFR BLD AUTO: 2.4 %
HCT VFR BLD CALC: 36 %
HGB BLD-MCNC: 11.4 G/DL
IMM GRANULOCYTES NFR BLD AUTO: 0.2 %
LYMPHOCYTES # BLD AUTO: 2.31 K/UL
LYMPHOCYTES NFR BLD AUTO: 28 %
MAN DIFF?: NORMAL
MCHC RBC-ENTMCNC: 28.4 PG
MCHC RBC-ENTMCNC: 31.7 GM/DL
MCV RBC AUTO: 89.8 FL
MONOCYTES # BLD AUTO: 0.67 K/UL
MONOCYTES NFR BLD AUTO: 8.1 %
NEUTROPHILS # BLD AUTO: 5.03 K/UL
NEUTROPHILS NFR BLD AUTO: 61.2 %
PLATELET # BLD AUTO: 314 K/UL
RBC # BLD: 4.01 M/UL
RBC # FLD: 16.4 %
WBC # FLD AUTO: 8.24 K/UL

## 2018-12-16 NOTE — PLAN
[FreeTextEntry1] : CONSULTANT NOTES FROM RHEUMATOLOGY AND DERMATOLOGY APPRECIATED\par TO FOLLOW-UP ALL SPECIALISTS AS DIRECTED\par MONITOR B12 AND D LEVELS\par CONTINUE CURRENT MEDICATIONS\par HEALTHY DIET AND LIFESTYLE MODIFICATIONS\par HEALTHY WEIGHT LOSS \par CALL WITH ANY QUESTIONS, CONCERNS OR CHANGES

## 2018-12-16 NOTE — REVIEW OF SYSTEMS
[Fatigue] : fatigue [Fever] : no fever [Chills] : no chills [Discharge] : no discharge [Pain] : no pain [Chest Pain] : no chest pain [Palpitations] : no palpitations [Shortness Of Breath] : no shortness of breath [Wheezing] : no wheezing [Cough] : no cough [Abdominal Pain] : no abdominal pain [Diarrhea] : diarrhea [Vomiting] : no vomiting [Melena] : no melena [Dysuria] : no dysuria [Hematuria] : no hematuria [Headache] : no headache [Dizziness] : no dizziness [Fainting] : no fainting

## 2018-12-18 ENCOUNTER — APPOINTMENT (OUTPATIENT)
Dept: DERMATOLOGY | Facility: CLINIC | Age: 58
End: 2018-12-18
Payer: COMMERCIAL

## 2018-12-18 VITALS
WEIGHT: 285 LBS | HEART RATE: 83 BPM | DIASTOLIC BLOOD PRESSURE: 78 MMHG | HEIGHT: 62 IN | SYSTOLIC BLOOD PRESSURE: 120 MMHG | BODY MASS INDEX: 52.44 KG/M2

## 2018-12-18 PROCEDURE — 99214 OFFICE O/P EST MOD 30 MIN: CPT

## 2018-12-20 ENCOUNTER — TRANSCRIPTION ENCOUNTER (OUTPATIENT)
Age: 58
End: 2018-12-20

## 2018-12-28 ENCOUNTER — MESSAGE (OUTPATIENT)
Age: 58
End: 2018-12-28

## 2018-12-28 ENCOUNTER — APPOINTMENT (OUTPATIENT)
Dept: GASTROENTEROLOGY | Facility: CLINIC | Age: 58
End: 2018-12-28
Payer: COMMERCIAL

## 2018-12-28 ENCOUNTER — TRANSCRIPTION ENCOUNTER (OUTPATIENT)
Age: 58
End: 2018-12-28

## 2018-12-28 VITALS
HEIGHT: 62 IN | RESPIRATION RATE: 16 BRPM | OXYGEN SATURATION: 98 % | SYSTOLIC BLOOD PRESSURE: 132 MMHG | DIASTOLIC BLOOD PRESSURE: 68 MMHG | BODY MASS INDEX: 52.44 KG/M2 | HEART RATE: 72 BPM | WEIGHT: 285 LBS

## 2018-12-28 DIAGNOSIS — G47.33 OBSTRUCTIVE SLEEP APNEA (ADULT) (PEDIATRIC): ICD-10-CM

## 2018-12-28 DIAGNOSIS — Z99.89 OBSTRUCTIVE SLEEP APNEA (ADULT) (PEDIATRIC): ICD-10-CM

## 2018-12-28 DIAGNOSIS — K82.4 CHOLESTEROLOSIS OF GALLBLADDER: ICD-10-CM

## 2018-12-28 PROCEDURE — 99214 OFFICE O/P EST MOD 30 MIN: CPT

## 2018-12-28 RX ORDER — TRIAMCINOLONE ACETONIDE 1 MG/G
0.1 CREAM TOPICAL
Qty: 30 | Refills: 0 | Status: DISCONTINUED | COMMUNITY
Start: 2018-06-05 | End: 2018-12-28

## 2018-12-28 RX ORDER — BETAMETHASONE DIPROPIONATE 0.5 MG/G
0.05 CREAM TOPICAL
Refills: 0 | Status: DISCONTINUED | COMMUNITY
End: 2018-12-28

## 2018-12-28 RX ORDER — MULTIVIT-MIN/IRON/FOLIC ACID/K 18-600-40
CAPSULE ORAL
Refills: 0 | Status: ACTIVE | COMMUNITY

## 2018-12-31 ENCOUNTER — TRANSCRIPTION ENCOUNTER (OUTPATIENT)
Age: 58
End: 2018-12-31

## 2019-01-02 ENCOUNTER — RX RENEWAL (OUTPATIENT)
Age: 59
End: 2019-01-02

## 2019-01-02 ENCOUNTER — TRANSCRIPTION ENCOUNTER (OUTPATIENT)
Age: 59
End: 2019-01-02

## 2019-01-11 ENCOUNTER — MEDICATION RENEWAL (OUTPATIENT)
Age: 59
End: 2019-01-11

## 2019-01-17 LAB
ALBUMIN SERPL ELPH-MCNC: 3.9 G/DL
ALP BLD-CCNC: 91 U/L
ALT SERPL-CCNC: 16 U/L
ANION GAP SERPL CALC-SCNC: 12 MMOL/L
AST SERPL-CCNC: 20 U/L
BASOPHILS # BLD AUTO: 0.02 K/UL
BASOPHILS NFR BLD AUTO: 0.3 %
BILIRUB SERPL-MCNC: 0.4 MG/DL
BUN SERPL-MCNC: 9 MG/DL
CALCIUM SERPL-MCNC: 9.3 MG/DL
CHLORIDE SERPL-SCNC: 107 MMOL/L
CO2 SERPL-SCNC: 23 MMOL/L
CREAT SERPL-MCNC: 0.9 MG/DL
EOSINOPHIL # BLD AUTO: 0.22 K/UL
EOSINOPHIL NFR BLD AUTO: 3 %
GLUCOSE SERPL-MCNC: 87 MG/DL
HCT VFR BLD CALC: 38.1 %
HGB BLD-MCNC: 11.9 G/DL
IMM GRANULOCYTES NFR BLD AUTO: 0.3 %
LYMPHOCYTES # BLD AUTO: 1.91 K/UL
LYMPHOCYTES NFR BLD AUTO: 26.4 %
MAN DIFF?: NORMAL
MCHC RBC-ENTMCNC: 29 PG
MCHC RBC-ENTMCNC: 31.2 GM/DL
MCV RBC AUTO: 92.9 FL
MONOCYTES # BLD AUTO: 0.6 K/UL
MONOCYTES NFR BLD AUTO: 8.3 %
NEUTROPHILS # BLD AUTO: 4.46 K/UL
NEUTROPHILS NFR BLD AUTO: 61.7 %
PLATELET # BLD AUTO: 309 K/UL
POTASSIUM SERPL-SCNC: 4 MMOL/L
PROT SERPL-MCNC: 7.8 G/DL
RBC # BLD: 4.1 M/UL
RBC # FLD: 16.3 %
SODIUM SERPL-SCNC: 142 MMOL/L
WBC # FLD AUTO: 7.23 K/UL

## 2019-01-18 ENCOUNTER — RESULT REVIEW (OUTPATIENT)
Age: 59
End: 2019-01-18

## 2019-01-18 LAB
25(OH)D3 SERPL-MCNC: 30.6 NG/ML
VIT B12 SERPL-MCNC: >2000 PG/ML

## 2019-01-29 ENCOUNTER — APPOINTMENT (OUTPATIENT)
Dept: DERMATOLOGY | Facility: CLINIC | Age: 59
End: 2019-01-29
Payer: COMMERCIAL

## 2019-01-29 VITALS
BODY MASS INDEX: 52.44 KG/M2 | HEIGHT: 62 IN | WEIGHT: 285 LBS | HEART RATE: 66 BPM | SYSTOLIC BLOOD PRESSURE: 100 MMHG | DIASTOLIC BLOOD PRESSURE: 70 MMHG

## 2019-01-29 DIAGNOSIS — K14.6 GLOSSODYNIA: ICD-10-CM

## 2019-01-29 DIAGNOSIS — L70.9 ACNE, UNSPECIFIED: ICD-10-CM

## 2019-01-29 DIAGNOSIS — K14.8 OTHER DISEASES OF TONGUE: ICD-10-CM

## 2019-01-29 PROCEDURE — 99214 OFFICE O/P EST MOD 30 MIN: CPT

## 2019-02-13 ENCOUNTER — APPOINTMENT (OUTPATIENT)
Dept: DERMATOLOGY | Facility: CLINIC | Age: 59
End: 2019-02-13

## 2019-02-15 ENCOUNTER — APPOINTMENT (OUTPATIENT)
Dept: DERMATOLOGY | Facility: CLINIC | Age: 59
End: 2019-02-15
Payer: COMMERCIAL

## 2019-02-15 ENCOUNTER — TRANSCRIPTION ENCOUNTER (OUTPATIENT)
Age: 59
End: 2019-02-15

## 2019-02-15 VITALS — SYSTOLIC BLOOD PRESSURE: 130 MMHG | DIASTOLIC BLOOD PRESSURE: 70 MMHG

## 2019-02-15 PROCEDURE — 99214 OFFICE O/P EST MOD 30 MIN: CPT

## 2019-02-22 ENCOUNTER — TRANSCRIPTION ENCOUNTER (OUTPATIENT)
Age: 59
End: 2019-02-22

## 2019-03-02 ENCOUNTER — TRANSCRIPTION ENCOUNTER (OUTPATIENT)
Age: 59
End: 2019-03-02

## 2019-03-04 ENCOUNTER — OTHER (OUTPATIENT)
Age: 59
End: 2019-03-04

## 2019-03-04 ENCOUNTER — APPOINTMENT (OUTPATIENT)
Dept: RHEUMATOLOGY | Facility: CLINIC | Age: 59
End: 2019-03-04
Payer: COMMERCIAL

## 2019-03-04 VITALS
TEMPERATURE: 97.8 F | DIASTOLIC BLOOD PRESSURE: 67 MMHG | BODY MASS INDEX: 52.44 KG/M2 | HEART RATE: 67 BPM | OXYGEN SATURATION: 98 % | SYSTOLIC BLOOD PRESSURE: 97 MMHG | WEIGHT: 285 LBS | HEIGHT: 62 IN | RESPIRATION RATE: 16 BRPM

## 2019-03-04 LAB
BASOPHILS # BLD AUTO: 0.04 K/UL
BASOPHILS NFR BLD AUTO: 0.4 %
EOSINOPHIL # BLD AUTO: 0.22 K/UL
EOSINOPHIL NFR BLD AUTO: 2 %
HCT VFR BLD CALC: 39 %
HGB BLD-MCNC: 11.8 G/DL
IMM GRANULOCYTES NFR BLD AUTO: 0.4 %
LYMPHOCYTES # BLD AUTO: 2.43 K/UL
LYMPHOCYTES NFR BLD AUTO: 22.3 %
MAN DIFF?: NORMAL
MCHC RBC-ENTMCNC: 30.3 GM/DL
MCHC RBC-ENTMCNC: 30.3 PG
MCV RBC AUTO: 100 FL
MONOCYTES # BLD AUTO: 0.8 K/UL
MONOCYTES NFR BLD AUTO: 7.4 %
NEUTROPHILS # BLD AUTO: 7.35 K/UL
NEUTROPHILS NFR BLD AUTO: 67.5 %
PLATELET # BLD AUTO: 293 K/UL
RBC # BLD: 3.9 M/UL
RBC # FLD: 16.5 %
WBC # FLD AUTO: 10.88 K/UL

## 2019-03-04 PROCEDURE — 99214 OFFICE O/P EST MOD 30 MIN: CPT

## 2019-03-05 ENCOUNTER — LABORATORY RESULT (OUTPATIENT)
Age: 59
End: 2019-03-05

## 2019-03-05 ENCOUNTER — APPOINTMENT (OUTPATIENT)
Dept: DERMATOLOGY | Facility: CLINIC | Age: 59
End: 2019-03-05
Payer: COMMERCIAL

## 2019-03-05 LAB
ALBUMIN SERPL ELPH-MCNC: 4.4 G/DL
ALP BLD-CCNC: 88 U/L
ALT SERPL-CCNC: 16 U/L
ANION GAP SERPL CALC-SCNC: 11 MMOL/L
APPEARANCE: CLEAR
APTT BLD: 30.5 SEC
AST SERPL-CCNC: 17 U/L
B2 GLYCOPROT1 IGA SERPL IA-ACNC: <5 SAU
BACTERIA: ABNORMAL
BILIRUB SERPL-MCNC: 0.4 MG/DL
BILIRUBIN URINE: NEGATIVE
BLOOD URINE: NEGATIVE
BUN SERPL-MCNC: 8 MG/DL
C3 SERPL-MCNC: 186 MG/DL
C4 SERPL-MCNC: 37 MG/DL
CALCIUM SERPL-MCNC: 10 MG/DL
CHLORIDE SERPL-SCNC: 106 MMOL/L
CK SERPL-CCNC: 61 U/L
CO2 SERPL-SCNC: 26 MMOL/L
COLOR: YELLOW
CREAT SERPL-MCNC: 0.76 MG/DL
CREAT SPEC-SCNC: 97 MG/DL
CREAT/PROT UR: 0.1 RATIO
GLUCOSE QUALITATIVE U: NEGATIVE
GLUCOSE SERPL-MCNC: 89 MG/DL
HYALINE CASTS: 1 /LPF
INR PPP: 0.99 RATIO
KETONES URINE: NEGATIVE
LEUKOCYTE ESTERASE URINE: ABNORMAL
MICROSCOPIC-UA: NORMAL
NITRITE URINE: NEGATIVE
PH URINE: 6
POTASSIUM SERPL-SCNC: 4.3 MMOL/L
PROT SERPL-MCNC: 7.7 G/DL
PROT UR-MCNC: 7 MG/DL
PROTEIN URINE: NORMAL
PT BLD: 11.3 SEC
RED BLOOD CELLS URINE: 2 /HPF
SODIUM SERPL-SCNC: 143 MMOL/L
SPECIFIC GRAVITY URINE: 1.01
SQUAMOUS EPITHELIAL CELLS: 3 /HPF
UROBILINOGEN URINE: NORMAL
WHITE BLOOD CELLS URINE: 31 /HPF

## 2019-03-05 PROCEDURE — 11104 PUNCH BX SKIN SINGLE LESION: CPT

## 2019-03-05 PROCEDURE — 99214 OFFICE O/P EST MOD 30 MIN: CPT | Mod: 25

## 2019-03-05 PROCEDURE — 11105 PUNCH BX SKIN EA SEP/ADDL: CPT

## 2019-03-05 NOTE — PHYSICAL EXAM
[Alert] : alert [Oriented x 3] : ~L oriented x 3 [Well Nourished] : well nourished [Conjunctiva Non-injected] : conjunctiva non-injected [No Visual Lymphadenopathy] : no visual  lymphadenopathy [No Clubbing] : no clubbing [No Edema] : no edema [No Bromhidrosis] : no bromhidrosis [No Chromhidrosis] : no chromhidrosis [FreeTextEntry3] : Erythematous edematous papules, some with collarettes of scale, on the elbows, back, arms, lower legs

## 2019-03-05 NOTE — HISTORY OF PRESENT ILLNESS
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 At that time, it was thought her presentation was c/w with a flare of cutaneous bullous lupus and her dapsone was increased to 75mg QD along with a prednisone taper. Remained on HCQ 200mg BID. Could not tolerate prednisone after one week due to HA and discontinued and noted worsening of her rash. No improvement with clobetasol. Does note some constipation that is new. Was seen by Dr. Asencio yesterday and had a normal CBC. Bloodwork was otherwise neg. \par \par Of note, patient had a bx of a blistering eruption on the dorsal hand that demonstrated subepidermal bulla with neutrophils and positive DIF with a lupus band. VU 1:640.

## 2019-03-06 LAB
B2 GLYCOPROT1 AB SER QL: NEGATIVE
CARDIOLIPIN AB SER IA-ACNC: NEGATIVE
DSDNA AB SER-ACNC: <12 IU/ML
ENA RNP AB SER IA-ACNC: <0.2 AL
ENA SM AB SER IA-ACNC: <0.2 AL
ENA SS-A AB SER IA-ACNC: <0.2 AL
ENA SS-B AB SER IA-ACNC: <0.2 AL

## 2019-03-14 ENCOUNTER — RX RENEWAL (OUTPATIENT)
Age: 59
End: 2019-03-14

## 2019-03-14 ENCOUNTER — RESULT REVIEW (OUTPATIENT)
Age: 59
End: 2019-03-14

## 2019-03-14 LAB
ENDOMYSIUM IGA SER QL: POSITIVE
ENDOMYSIUM IGA TITR SER: ABNORMAL
TTG IGA SER IA-ACNC: 16.1 U/ML
TTG IGA SER-ACNC: POSITIVE
TTG IGG SER IA-ACNC: 13.8 U/ML
TTG IGG SER IA-ACNC: POSITIVE

## 2019-03-19 ENCOUNTER — APPOINTMENT (OUTPATIENT)
Dept: DERMATOLOGY | Facility: CLINIC | Age: 59
End: 2019-03-19
Payer: COMMERCIAL

## 2019-03-19 DIAGNOSIS — Z48.02 ENCOUNTER FOR REMOVAL OF SUTURES: ICD-10-CM

## 2019-03-19 PROCEDURE — 99214 OFFICE O/P EST MOD 30 MIN: CPT

## 2019-03-26 ENCOUNTER — APPOINTMENT (OUTPATIENT)
Dept: DERMATOLOGY | Facility: CLINIC | Age: 59
End: 2019-03-26

## 2019-03-29 ENCOUNTER — APPOINTMENT (OUTPATIENT)
Dept: ORTHOPEDIC SURGERY | Facility: CLINIC | Age: 59
End: 2019-03-29
Payer: COMMERCIAL

## 2019-03-29 VITALS
BODY MASS INDEX: 52.44 KG/M2 | SYSTOLIC BLOOD PRESSURE: 144 MMHG | WEIGHT: 285 LBS | HEIGHT: 62 IN | HEART RATE: 112 BPM | DIASTOLIC BLOOD PRESSURE: 86 MMHG

## 2019-03-29 DIAGNOSIS — Z96.659 PAIN DUE TO INTERNAL ORTHOPEDIC PROSTHETIC DEVICES, IMPLANTS AND GRAFTS, SUBSEQUENT ENCOUNTER: ICD-10-CM

## 2019-03-29 DIAGNOSIS — T84.84XD PAIN DUE TO INTERNAL ORTHOPEDIC PROSTHETIC DEVICES, IMPLANTS AND GRAFTS, SUBSEQUENT ENCOUNTER: ICD-10-CM

## 2019-03-29 PROCEDURE — 99213 OFFICE O/P EST LOW 20 MIN: CPT

## 2019-03-29 PROCEDURE — 73562 X-RAY EXAM OF KNEE 3: CPT

## 2019-03-29 RX ORDER — PREDNISONE 10 MG/1
10 TABLET ORAL
Qty: 91 | Refills: 0 | Status: DISCONTINUED | COMMUNITY
Start: 2019-02-15 | End: 2019-03-29

## 2019-03-29 RX ORDER — HYDROXYCHLOROQUINE SULFATE 200 MG/1
200 TABLET, FILM COATED ORAL
Qty: 180 | Refills: 3 | Status: DISCONTINUED | COMMUNITY
Start: 2018-10-23 | End: 2019-03-29

## 2019-03-29 RX ORDER — CLOBETASOL PROPIONATE 0.5 MG/G
0.05 OINTMENT TOPICAL
Qty: 1 | Refills: 1 | Status: DISCONTINUED | COMMUNITY
Start: 2019-02-15 | End: 2019-03-29

## 2019-03-29 NOTE — PHYSICAL EXAM
[LE] : Sensory: Intact in bilateral lower extremities [ALL] : dorsalis pedis, posterior tibial, femoral, popliteal, and radial 2+ and symmetric bilaterally [de-identified] : GENERAL APPEARANCE: Well nourished and hydrated, pleasant, alert, and oriented x 3. Appears their stated age. \par HEENT: Normocephalic, extraocular eye motion intact. Nasal septum midline. Oral cavity clear. External auditory canal clear. \par RESPIRATORY: Breath sounds clear and audible in all lobes. No wheezing, No accessory muscle use.\par CARDIOVASCULAR: No apparent abnormalities. No lower leg edema. No varicosities. Pedal pulses are palpable.\par NEUROLOGIC: Sensation is normal, no muscle weakness in the upper or lower extremities.\par DERMATOLOGIC: No apparent skin lesions, moist, warm, no rash.\par SPINE: Cervical spine appears normal and moves freely; thoracic spine appears normal and moves freely; lumbosacral spine appears normal and moves freely, normal, nontender.\par MUSCULOSKELETAL: Hands, wrists, and elbows are normal and move freely, shoulders are normal and move freely.  [de-identified] : Left knee midline incision is well-healed with no signs of infection. Full, painless range of motion, 0 degrees of extension to 120 degrees of flexion.\par  [de-identified] : 3V xray of the left knee done in office today and reviewed by Dr. Ronn Stone demonstrates s/p left TKR with implants in good positioning, no sign of wear, loosening or subsidence.

## 2019-03-29 NOTE — HISTORY OF PRESENT ILLNESS
[Pain Location] : pain [0] : a current pain level of 0/10 [Exercise Regimen] : relieved by exercise regimen [Stable] : stable [___ yrs] : [unfilled] year(s) ago [de-identified] : 58 year old female presents back to the office for left knee stiffness. The patient is s/p left TKR DOS 11/3/2013. The patient is here for an annual follow up of a left TKA about 5 years ago. Today she denies pain and has some stiffness only. She felt that leg massages in the past helped with her stiffness.  She denies fever and chills. She denies limits with ADLs. She notes some pain in her left knee when using treadmills. \par she tried compression stocking which did not help. she would like to obtain Venodyne system in hope it will improve stiffness\par  [de-identified] : massage

## 2019-03-29 NOTE — ADDENDUM
[FreeTextEntry1] : I, Arslan Zuniga, acted solely as a scribe for Dr. Ronn Stone on this date 03/29/2019.

## 2019-03-29 NOTE — REASON FOR VISIT
The patient is a 41y Female complaining of weakness. [Follow-Up Visit] : a follow-up visit for [Other: ____] : [unfilled]

## 2019-03-29 NOTE — REVIEW OF SYSTEMS
[Negative] : Heme/Lymph [Joint Stiffness] : joint stiffness [Joint Pain] : no joint pain [Joint Swelling] : no joint swelling [FreeTextEntry9] : left knee

## 2019-04-03 ENCOUNTER — MEDICATION RENEWAL (OUTPATIENT)
Age: 59
End: 2019-04-03

## 2019-04-04 ENCOUNTER — RESULT REVIEW (OUTPATIENT)
Age: 59
End: 2019-04-04

## 2019-04-04 ENCOUNTER — APPOINTMENT (OUTPATIENT)
Dept: GASTROENTEROLOGY | Facility: HOSPITAL | Age: 59
End: 2019-04-04

## 2019-04-04 ENCOUNTER — OUTPATIENT (OUTPATIENT)
Dept: OUTPATIENT SERVICES | Facility: HOSPITAL | Age: 59
LOS: 1 days | End: 2019-04-04
Payer: COMMERCIAL

## 2019-04-04 DIAGNOSIS — R12 HEARTBURN: ICD-10-CM

## 2019-04-04 DIAGNOSIS — K22.10 ULCER OF ESOPHAGUS W/OUT BLEEDING: ICD-10-CM

## 2019-04-04 DIAGNOSIS — K82.4 CHOLESTEROLOSIS OF GALLBLADDER: ICD-10-CM

## 2019-04-04 DIAGNOSIS — E53.8 DEFICIENCY OF OTHER SPECIFIED B GROUP VITAMINS: ICD-10-CM

## 2019-04-04 DIAGNOSIS — L93.2 OTHER LOCAL LUPUS ERYTHEMATOSUS: ICD-10-CM

## 2019-04-04 DIAGNOSIS — Z12.11 ENCOUNTER FOR SCREENING FOR MALIGNANT NEOPLASM OF COLON: ICD-10-CM

## 2019-04-04 PROCEDURE — 43239 EGD BIOPSY SINGLE/MULTIPLE: CPT

## 2019-04-04 PROCEDURE — 45378 DIAGNOSTIC COLONOSCOPY: CPT

## 2019-04-04 PROCEDURE — G0105: CPT

## 2019-04-04 PROCEDURE — 88342 IMHCHEM/IMCYTCHM 1ST ANTB: CPT

## 2019-04-04 PROCEDURE — 88305 TISSUE EXAM BY PATHOLOGIST: CPT

## 2019-04-04 PROCEDURE — 43239 EGD BIOPSY SINGLE/MULTIPLE: CPT | Mod: 59

## 2019-04-04 NOTE — PROCEDURE
[Colon Cancer Screening] : colon cancer screening [Hx of Colon Polyps] : history of colon polyps [Bowel Prep Kit] : the patient took the appropriate bowel preparation kit as directed [Approved Diet Followed] : the patient avoided solid foods and adhered to the approved diet list for 24 hours prior to the procedure [Prep Qualtiy: ___] : Prep Quality:  [unfilled] [Withdrawal Time: ___] : Withdrawal Time:  [unfilled] [Left Lateral Decubitus] : The patient was positioned in the left lateral decubitus position [Abnormal Rectum] : a normal rectum [External Hemorrhoids] : no external hemorrhoids [Terminal Ileum via Ileocecal Valve] : and the terminal ileum was examined by entering the ileocecal valve [Minimal Difficulty] : with minimal difficulty [Insufflated] : insufflated [Multiple Passes Needed] : after multiple passes [Retroflex View] : a retroflex view of the rectum was performed [Diverticulosis] : diverticulosis [No Complications] : There were no complications [de-identified] : Trilyte fully taken last night [de-identified] : TI / ICV were normal.   [de-identified] : Scattered diverticulosis noted.   [de-identified] : Scattered diverticulosis noted.   [de-identified] : Including retroflexion exam.  A few skin tags were noted.  No active hemorrhoids.  No proctitis.   [With Biopsy] : with biopsy [Procedure Explained] : The procedure was explained [Allergies Reviewed] : allergies reviewed. 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  r/o Celaic disease.   [de-identified] : A large distal 5 cm Hiatus hernia was noted.  Z line at 38 cm was noted.  Intact.  Mild mucosal inflammation noted.  No BE or stricture.  No Varices.  2 bx obtained on the gastric side of the Z line.  Gaping entrance into the stomach was noted.   [de-identified] : 4 random biopsies were done.   [de-identified] : 3 small distal 4 mm benign appearing Gastric ulcers in pre-pyloric location were noted.  Partially healed.  Small eschars noted.  No SRH.  No tumor.  5 biopsies of the antrum were done upon the ulcers.   [de-identified] : No Lesions were noted.  4 random biopsies were done.  [de-identified] : Mild scalloping noted.  Otherwise normal mucosa.  4 random biopsies were done [de-identified] : 3 small distal gastric antral ulcers.  Lg HH. No overt celiac disease.     [FreeTextEntry1] : No colonic neoplasia.  Given PH of colon polyps, advised repeat colonoscopy in 5 yrs for polyp surveillance.  \par Diverticulosis coli:  High fiber diet.  \par \par Multiple small partially healing gastric antral ulcers:  Call in 1 week for path check.  Treat accordingly.  \par New rx for Pantoprazole 40 mg po qd x 3 months.  Rx sent to pharmacy.\par Hiatus hernia. Anti reflux diet.  \par Possible celiac disease:  Call in 1 week for path check.  \par \par

## 2019-04-04 NOTE — PHYSICAL EXAM
[General Appearance - Alert] : alert [General Appearance - In No Acute Distress] : in no acute distress [Sclera] : the sclera and conjunctiva were normal [PERRL With Normal Accommodation] : pupils were equal in size, round, and reactive to light [Extraocular Movements] : extraocular movements were intact [Outer Ear] : the ears and nose were normal in appearance [Oropharynx] : the oropharynx was normal [Neck Appearance] : the appearance of the neck was normal [Neck Cervical Mass (___cm)] : no neck mass was observed [Jugular Venous Distention Increased] : there was no jugular-venous distention [Thyroid Diffuse Enlargement] : the thyroid was not enlarged [Thyroid Nodule] : there were no palpable thyroid nodules [Auscultation Breath Sounds / Voice Sounds] : lungs were clear to auscultation bilaterally [Heart Rate And Rhythm] : heart rate was normal and rhythm regular [Heart Sounds] : normal S1 and S2 [Heart Sounds Gallop] : no gallops [Murmurs] : no murmurs [Heart Sounds Pericardial Friction Rub] : no pericardial rub [Bowel Sounds] : normal bowel sounds [Abdomen Soft] : soft [Abdomen Tenderness] : non-tender [] : no hepato-splenomegaly [Abdomen Mass (___ Cm)] : no abdominal mass palpated [Normal Sphincter Tone] : normal sphincter tone [No Rectal Mass] : no rectal mass [No CVA Tenderness] : no ~M costovertebral angle tenderness [No Spinal Tenderness] : no spinal tenderness [Abnormal Walk] : normal gait [Nail Clubbing] : no clubbing  or cyanosis of the fingernails [Musculoskeletal - Swelling] : no joint swelling seen [Motor Tone] : muscle strength and tone were normal [Internal Hemorrhoid] : no internal hemorrhoids [External Hemorrhoid] : no external hemorrhoids [Occult Blood Positive] : stool was negative for occult blood [FreeTextEntry1] : 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

## 2019-04-04 NOTE — HISTORY OF PRESENT ILLNESS
[FreeTextEntry1] : 59 yo WF with M Obesity; EDITH; Discoid Lupus and heartburn and PH of colon polyps.  Pt had multiple colon polyps removed 3 yrs ago; all benign.  Currently asymptomatic.  All of recent BW is reviewed and normal.  No new  medical issues.

## 2019-04-08 ENCOUNTER — APPOINTMENT (OUTPATIENT)
Dept: DERMATOLOGY | Facility: CLINIC | Age: 59
End: 2019-04-08
Payer: COMMERCIAL

## 2019-04-08 DIAGNOSIS — L30.9 DERMATITIS, UNSPECIFIED: ICD-10-CM

## 2019-04-08 LAB — SURGICAL PATHOLOGY STUDY: SIGNIFICANT CHANGE UP

## 2019-04-08 PROCEDURE — 99214 OFFICE O/P EST MOD 30 MIN: CPT

## 2019-04-12 ENCOUNTER — APPOINTMENT (OUTPATIENT)
Dept: GASTROENTEROLOGY | Facility: HOSPITAL | Age: 59
End: 2019-04-12

## 2019-04-15 ENCOUNTER — RX CHANGE (OUTPATIENT)
Age: 59
End: 2019-04-15

## 2019-04-15 ENCOUNTER — MEDICATION RENEWAL (OUTPATIENT)
Age: 59
End: 2019-04-15

## 2019-04-18 ENCOUNTER — TRANSCRIPTION ENCOUNTER (OUTPATIENT)
Age: 59
End: 2019-04-18

## 2019-04-18 ENCOUNTER — CLINICAL ADVICE (OUTPATIENT)
Age: 59
End: 2019-04-18

## 2019-04-25 ENCOUNTER — RX CHANGE (OUTPATIENT)
Age: 59
End: 2019-04-25

## 2019-04-26 ENCOUNTER — OTHER (OUTPATIENT)
Age: 59
End: 2019-04-26

## 2019-04-26 ENCOUNTER — RX RENEWAL (OUTPATIENT)
Age: 59
End: 2019-04-26

## 2019-04-30 ENCOUNTER — APPOINTMENT (OUTPATIENT)
Dept: DERMATOLOGY | Facility: CLINIC | Age: 59
End: 2019-04-30

## 2019-05-07 ENCOUNTER — RESULT REVIEW (OUTPATIENT)
Age: 59
End: 2019-05-07

## 2019-05-07 ENCOUNTER — APPOINTMENT (OUTPATIENT)
Dept: DERMATOLOGY | Facility: CLINIC | Age: 59
End: 2019-05-07
Payer: COMMERCIAL

## 2019-05-07 LAB
ANNOTATION COMMENT IMP: NORMAL
BASOPHILS # BLD AUTO: 0.04 K/UL
BASOPHILS NFR BLD AUTO: 0.5 %
EOSINOPHIL # BLD AUTO: 0.27 K/UL
EOSINOPHIL NFR BLD AUTO: 3.4 %
HCT VFR BLD CALC: 34.5 %
HGB BLD-MCNC: 10.7 G/DL
HLA-DQ2: POSITIVE
HLA-DQ8 QL: NEGATIVE
IMM GRANULOCYTES NFR BLD AUTO: 0.4 %
LYMPHOCYTES # BLD AUTO: 1.84 K/UL
LYMPHOCYTES NFR BLD AUTO: 23.4 %
MAN DIFF?: NORMAL
MCHC RBC-ENTMCNC: 31 GM/DL
MCHC RBC-ENTMCNC: 32 PG
MCV RBC AUTO: 103.3 FL
MONOCYTES # BLD AUTO: 0.63 K/UL
MONOCYTES NFR BLD AUTO: 8 %
NEUTROPHILS # BLD AUTO: 5.07 K/UL
NEUTROPHILS NFR BLD AUTO: 64.3 %
PLATELET # BLD AUTO: 273 K/UL
RBC # BLD: 3.34 M/UL
RBC # BLD: 3.34 M/UL
RBC # FLD: 15.9 %
REF LAB TEST METHOD: NORMAL
RETICS # AUTO: 3.3 %
RETICS AGGREG/RBC NFR: 111.6 K/UL
WBC # FLD AUTO: 7.88 K/UL

## 2019-05-07 PROCEDURE — 99214 OFFICE O/P EST MOD 30 MIN: CPT

## 2019-05-07 NOTE — ASSESSMENT
[FreeTextEntry1] : 1. Dermatitis Herpetiformis\par In the context of positive IgA TTG and endomysial serologies and pathology, favor DH\par Awaiting HLA DQ2/DQ8 tests that were recently sent\par Tapered off prednisone and now only on Dapsone 125mg QD x 2-3 weeks. Will increase to 150mg today\par Recommend HC 2.5% ointment BID x 1-2 weeks for the face. Patient to check at home to see if she has the prescription before rx\par Recommended ongoing adherence to gluten-free diet\par \par 2. Med monitoring\par CBC with mild anemia but with appropriate compensation (4/26/19)\par \par RTC 1 month

## 2019-05-07 NOTE — HISTORY OF PRESENT ILLNESS
[FreeTextEntry1] : f/u DH [de-identified] : 57 yo F here for f/u. Since LV, she has been able to taper off the prednisone and has done well from a cutaneous standpoint. Notes that in the last few days, she has been itchy on her face in the context of a URI. Path from colonoscopy not suggestive of Celiac Disease although Endomysial AB and TTG were both positive. Increased her dapsone to 125mg in mid-April and has been tolerating it well without any symptoms. Otherwise, no new, evolving, or symptomatic skin lesions.

## 2019-05-07 NOTE — PHYSICAL EXAM
[Alert] : alert [Oriented x 3] : ~L oriented x 3 [Well Nourished] : well nourished [Conjunctiva Non-injected] : conjunctiva non-injected [No Visual Lymphadenopathy] : no visual  lymphadenopathy [No Clubbing] : no clubbing [No Edema] : no edema [No Bromhidrosis] : no bromhidrosis [No Chromhidrosis] : no chromhidrosis [FreeTextEntry3] : Few excoriated papules on the cheeks\par Single erythematous vesicle on the L dorsal hand\par

## 2019-05-08 ENCOUNTER — TRANSCRIPTION ENCOUNTER (OUTPATIENT)
Age: 59
End: 2019-05-08

## 2019-05-08 ENCOUNTER — RX CHANGE (OUTPATIENT)
Age: 59
End: 2019-05-08

## 2019-05-09 ENCOUNTER — APPOINTMENT (OUTPATIENT)
Dept: FAMILY MEDICINE | Facility: CLINIC | Age: 59
End: 2019-05-09
Payer: COMMERCIAL

## 2019-05-09 VITALS
SYSTOLIC BLOOD PRESSURE: 118 MMHG | HEIGHT: 62 IN | TEMPERATURE: 98.3 F | DIASTOLIC BLOOD PRESSURE: 88 MMHG | WEIGHT: 293 LBS | BODY MASS INDEX: 53.92 KG/M2 | HEART RATE: 102 BPM

## 2019-05-09 DIAGNOSIS — K12.1 OTHER FORMS OF STOMATITIS: ICD-10-CM

## 2019-05-09 PROCEDURE — 99214 OFFICE O/P EST MOD 30 MIN: CPT

## 2019-05-10 PROBLEM — K12.1 ULCER MOUTH: Status: ACTIVE | Noted: 2019-05-10

## 2019-05-12 NOTE — ASSESSMENT
[FreeTextEntry1] : \par Given recent abxs and PE and history will  not prescribe abxs at this time . \par Supportive therapies reviewed and advised.\par \par TOpical for ulcer in mouth.\par Call office or revisit if ulcer does not heal.

## 2019-05-12 NOTE — PHYSICAL EXAM
[No Acute Distress] : no acute distress [Normal Sclera/Conjunctiva] : normal sclera/conjunctiva [Supple] : supple [No Respiratory Distress] : no respiratory distress  [Clear to Auscultation] : lungs were clear to auscultation bilaterally [Normal Rate] : normal rate  [Regular Rhythm] : with a regular rhythm [Normal Posterior Cervical Nodes] : no posterior cervical lymphadenopathy [Normal Anterior Cervical Nodes] : no anterior cervical lymphadenopathy [Normal Gait] : normal gait [No Focal Deficits] : no focal deficits [Alert and Oriented x3] : oriented to person, place, and time [de-identified] : OMM  pharynx a bit hyperemic    ulcer MILD   roof of mouth  [de-identified] : ill appearing  non toxic  [de-identified] : HR  92

## 2019-05-12 NOTE — HISTORY OF PRESENT ILLNESS
[Congestion] : congestion [Cold Symptoms] : cold symptoms [Moderate] : moderate [___ Weeks ago] :  [unfilled] weeks ago [Fatigue] : fatigue [Headache] : headache [Improving] : improving [Chills] : no chills [Earache] : no earache [Fever] : no fever [FreeTextEntry8] : \par GI /DERM reviewed and appreciated. \par Recent antibiotics for H pylori.

## 2019-05-20 ENCOUNTER — LABORATORY RESULT (OUTPATIENT)
Age: 59
End: 2019-05-20

## 2019-05-20 ENCOUNTER — APPOINTMENT (OUTPATIENT)
Dept: GASTROENTEROLOGY | Facility: CLINIC | Age: 59
End: 2019-05-20
Payer: COMMERCIAL

## 2019-05-20 VITALS
WEIGHT: 293 LBS | BODY MASS INDEX: 53.92 KG/M2 | HEIGHT: 62 IN | HEART RATE: 91 BPM | DIASTOLIC BLOOD PRESSURE: 60 MMHG | OXYGEN SATURATION: 93 % | SYSTOLIC BLOOD PRESSURE: 110 MMHG

## 2019-05-20 DIAGNOSIS — K25.9 GASTRIC ULCER, UNSPECIFIED AS ACUTE OR CHRONIC, W/OUT HEMORRHAGE OR PERFORATION: ICD-10-CM

## 2019-05-20 DIAGNOSIS — Z86.69 PERSONAL HISTORY OF OTHER DISEASES OF THE NERVOUS SYSTEM AND SENSE ORGANS: ICD-10-CM

## 2019-05-20 DIAGNOSIS — A04.8 OTHER SPECIFIED BACTERIAL INTESTINAL INFECTIONS: ICD-10-CM

## 2019-05-20 PROCEDURE — 36415 COLL VENOUS BLD VENIPUNCTURE: CPT

## 2019-05-20 PROCEDURE — 99245 OFF/OP CONSLTJ NEW/EST HI 55: CPT | Mod: 25

## 2019-05-20 RX ORDER — OMEPRAZOLE 20 MG/1
20 CAPSULE, DELAYED RELEASE ORAL TWICE DAILY
Qty: 28 | Refills: 0 | Status: DISCONTINUED | COMMUNITY
Start: 2019-04-15 | End: 2019-05-20

## 2019-05-20 RX ORDER — SODIUM SULFATE, POTASSIUM SULFATE, MAGNESIUM SULFATE 17.5; 3.13; 1.6 G/ML; G/ML; G/ML
17.5-3.13-1.6 SOLUTION, CONCENTRATE ORAL
Qty: 1 | Refills: 0 | Status: DISCONTINUED | COMMUNITY
Start: 2018-12-28 | End: 2019-05-20

## 2019-05-20 RX ORDER — METRONIDAZOLE 500 MG/1
500 TABLET ORAL TWICE DAILY
Qty: 28 | Refills: 0 | Status: DISCONTINUED | COMMUNITY
Start: 2019-04-15 | End: 2019-05-20

## 2019-05-20 RX ORDER — PREDNISONE 10 MG/1
10 TABLET ORAL
Qty: 24 | Refills: 0 | Status: DISCONTINUED | COMMUNITY
Start: 2019-03-19 | End: 2019-05-20

## 2019-05-20 RX ORDER — AMOXICILLIN 500 MG/1
500 TABLET, FILM COATED ORAL TWICE DAILY
Qty: 56 | Refills: 0 | Status: DISCONTINUED | COMMUNITY
Start: 2019-04-15 | End: 2019-05-20

## 2019-05-20 RX ORDER — CLARITHROMYCIN 500 MG/1
500 TABLET, FILM COATED ORAL
Qty: 28 | Refills: 0 | Status: DISCONTINUED | COMMUNITY
Start: 2019-04-15 | End: 2019-05-20

## 2019-05-20 RX ORDER — CHOLECALCIFEROL (VITAMIN D3) 25 MCG
TABLET ORAL
Refills: 0 | Status: ACTIVE | COMMUNITY

## 2019-05-20 RX ORDER — FERROUS SULFATE 325(65) MG
325 (65 FE) TABLET ORAL
Refills: 0 | Status: DISCONTINUED | COMMUNITY
End: 2019-05-20

## 2019-05-20 RX ORDER — POLYETHYLENE GLYOCOL 3350, SODIUM CHLORIDE, SODIUM BICARBONATE AND POTASSIUM CHLORIDE 420; 11.2; 5.72; 1.48 G/4L; G/4L; G/4L; G/4L
420 POWDER, FOR SOLUTION NASOGASTRIC; ORAL
Qty: 1 | Refills: 0 | Status: DISCONTINUED | COMMUNITY
Start: 2018-12-28 | End: 2019-05-20

## 2019-05-20 NOTE — HISTORY OF PRESENT ILLNESS
[FreeTextEntry1] : The patient is a 58-year-old woman who developed a pruritic rash initially on her hands in March of 2018. 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 The patient was treated with prednisone and hydroxychloroquine. Despite this the rash continued to spread to other parts of her body including her face. She switched to another dermatologist Dr. 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 Biopsies were taken with positive direct immunofluorescence consistent with this diagnosis. The patient has been on dapsone since November but the dose has been increased in March. At that time, the patient was advised to be evaluated for celiac disease. She had no symptoms referrable to the GI tract.\par \par The patient had blood work done in March that showed an elevated tissue transglutaminase IgA of 16.1 and a positive anti-endomysial antibody. She subsequently underwent EGD and colonoscopy on April 4, 2019 by another physician. EGD was significant for a 5 cm hiatal hernia with 3 prepyloric antral ulcers and mild scalloping noted. Biopsies from the duodenum showed mild to moderate villous blunting with minimally increased intraepithelial lymphocytes along with a finding of intestinal metaplasia and H. pylori in the stomach. Colonoscopy was significant only for diverticulosis. She does have a history of colonic polyps. Celiac genetic testing was done showing positive DQ2.  Blood work also showed a mild anemia with a hemoglobin and hematocrit of 10.7 and 34.5 respectively.\par \par The patient has been treated for H. pylori with 2 weeks of amoxicillin, clarithromycin, metronidazole, and pantoprazole.\par \par The patient denies abdominal pain, bloating, heartburn, or dysphagia. She is to solid bowel movements a day without melena or bright red blood per rectum. She has been gaining weight and is gained 10 pounds over the past month. She recently started on a gluten-free diet although she admits that she is cheated. She did develop a new hand lesion yesterday.

## 2019-05-20 NOTE — REVIEW OF SYSTEMS
[Recent Weight Gain (___ Lbs)] : recent [unfilled] ~Ulb weight gain [As Noted in HPI] : as noted in HPI [Negative] : Heme/Lymph [FreeTextEntry9] : knee pain

## 2019-05-20 NOTE — ASSESSMENT
[FreeTextEntry1] : Patient with dermatitis herpetiformis with consistent skin biopsies. Because this disease is strongly associated with celiac disease, the patient underwent evaluation for celiac disease. In the process, the patient was found to have 3 prepyloric ulcers with the presence of H. pylori. She is now being treated for H. pylori.\par \par I had a very long conversation with the patient regarding celiac disease, how it was diagnosed, there was manifestations and potential complications, and its treatment. I explained that the diagnosis of celiac disease is made by a combination of multiple studies. Serologies revealed an elevated tissue transglutaminase IgA consistent with celiac disease. Duodenal biopsies showed mild to moderate villous blunting with minimally increased intraepithelial lymphocytes. On his biopsy findings are nonspecific for celiac disease, they are consistent. The patient has positive genetic testing for celiac disease as well. I explained to her that, even with the diagnosis of dermatitis herpetiformis alone but especially in concert with celiac disease, she will need to be on a strict 100% gluten free diet free of contact or cross contamination with gluten for the rest of her life.\par \par Information was given to the patient regarding celiac disease and a gluten free diet. The patient was also given a list of local restaurants that are "celiac friendly".\par \par Bloodwork was sent for CBC, chem-pack, TSH, celiac markers, iron studies, B12, folate, vitamin D, and magnesium.\par \par Patient was sent for a bone density scan.\par \par The patient was given the name of a nutitionist, Siva Johnson, to see.\par \par The patient is currently off of her proton pump inhibitor for 2 weeks and will be going for H. pylori breath test in early June. She will have these results forwarded to me. She will then go back on pantoprazole daily for ulcer healing.\par \par The patient will need a colonoscopy in 3-5 years.\par \par Patient will return to see me in 2 months. She will require eventual EGD to assess for ulcer healing and to evaluate for resolution of the small bowel biopsy findings.

## 2019-05-20 NOTE — REASON FOR VISIT
[Initial Evaluation] : an initial evaluation [FreeTextEntry1] : ? celiac disease, dermatitis herpetiformis, gastric ulcers, H pylori, h/o polyps

## 2019-05-20 NOTE — PHYSICAL EXAM
[General Appearance - Alert] : alert [General Appearance - In No Acute Distress] : in no acute distress [Neck Appearance] : the appearance of the neck was normal [Neck Cervical Mass (___cm)] : no neck mass was observed [Jugular Venous Distention Increased] : there was no jugular-venous distention [Thyroid Diffuse Enlargement] : the thyroid was not enlarged [Thyroid Nodule] : there were no palpable thyroid nodules [Auscultation Breath Sounds / Voice Sounds] : lungs were clear to auscultation bilaterally [Heart Rate And Rhythm] : heart rate was normal and rhythm regular [Heart Sounds] : normal S1 and S2 [Heart Sounds Gallop] : no gallops [Murmurs] : no murmurs [Heart Sounds Pericardial Friction Rub] : no pericardial rub [Edema] : there was no peripheral edema [Bowel Sounds] : normal bowel sounds [Abdomen Soft] : soft [Abdomen Tenderness] : non-tender [] : no hepato-splenomegaly [Abdomen Mass (___ Cm)] : no abdominal mass palpated [FreeTextEntry1] : obese [No CVA Tenderness] : no ~M costovertebral angle tenderness [No Spinal Tenderness] : no spinal tenderness [Oriented To Time, Place, And Person] : oriented to person, place, and time [Impaired Insight] : insight and judgment were intact [Affect] : the affect was normal

## 2019-05-22 LAB
25(OH)D3 SERPL-MCNC: 19.7 NG/ML
ALBUMIN SERPL ELPH-MCNC: 4.7 G/DL
ALP BLD-CCNC: 101 U/L
ALT SERPL-CCNC: 21 U/L
ANION GAP SERPL CALC-SCNC: 12 MMOL/L
AST SERPL-CCNC: 21 U/L
BASOPHILS # BLD AUTO: 0.04 K/UL
BASOPHILS NFR BLD AUTO: 0.5 %
BILIRUB SERPL-MCNC: 0.5 MG/DL
BUN SERPL-MCNC: 10 MG/DL
CALCIUM SERPL-MCNC: 9.7 MG/DL
CHLORIDE SERPL-SCNC: 104 MMOL/L
CO2 SERPL-SCNC: 25 MMOL/L
CREAT SERPL-MCNC: 0.76 MG/DL
ENDOMYSIUM IGA SER QL: NEGATIVE
ENDOMYSIUM IGA TITR SER: NORMAL
EOSINOPHIL # BLD AUTO: 0.38 K/UL
EOSINOPHIL NFR BLD AUTO: 4.5 %
FERRITIN SERPL-MCNC: 63 NG/ML
FOLATE SERPL-MCNC: >20 NG/ML
GLIADIN IGA SER QL: 59.6 UNITS
GLIADIN IGG SER QL: 67.5 UNITS
GLIADIN PEPTIDE IGA SER-ACNC: POSITIVE
GLIADIN PEPTIDE IGG SER-ACNC: POSITIVE
GLUCOSE SERPL-MCNC: 96 MG/DL
HCT VFR BLD CALC: 38.8 %
HGB BLD-MCNC: 11.7 G/DL
IGA SER QL IEP: 170 MG/DL
IMM GRANULOCYTES NFR BLD AUTO: 0.4 %
IRON SATN MFR SERPL: 18 %
IRON SERPL-MCNC: 65 UG/DL
LYMPHOCYTES # BLD AUTO: 2.5 K/UL
LYMPHOCYTES NFR BLD AUTO: 29.3 %
MAGNESIUM SERPL-MCNC: 2.1 MG/DL
MAN DIFF?: NORMAL
MCHC RBC-ENTMCNC: 30.2 GM/DL
MCHC RBC-ENTMCNC: 31.7 PG
MCV RBC AUTO: 105.1 FL
MONOCYTES # BLD AUTO: 0.76 K/UL
MONOCYTES NFR BLD AUTO: 8.9 %
NEUTROPHILS # BLD AUTO: 4.82 K/UL
NEUTROPHILS NFR BLD AUTO: 56.4 %
PLATELET # BLD AUTO: 372 K/UL
POTASSIUM SERPL-SCNC: 3.8 MMOL/L
PROT SERPL-MCNC: 8 G/DL
RBC # BLD: 3.69 M/UL
RBC # FLD: 15.4 %
SODIUM SERPL-SCNC: 141 MMOL/L
TIBC SERPL-MCNC: 362 UG/DL
TSH SERPL-ACNC: 1.54 UIU/ML
TTG IGA SER IA-ACNC: 7 U/ML
TTG IGA SER-ACNC: ABNORMAL
TTG IGG SER IA-ACNC: 9.2 U/ML
TTG IGG SER IA-ACNC: POSITIVE
UIBC SERPL-MCNC: 297 UG/DL
VIT B12 SERPL-MCNC: 1334 PG/ML
WBC # FLD AUTO: 8.53 K/UL

## 2019-06-04 ENCOUNTER — LABORATORY RESULT (OUTPATIENT)
Age: 59
End: 2019-06-04

## 2019-06-04 ENCOUNTER — RESULT REVIEW (OUTPATIENT)
Age: 59
End: 2019-06-04

## 2019-06-04 ENCOUNTER — FORM ENCOUNTER (OUTPATIENT)
Age: 59
End: 2019-06-04

## 2019-06-05 ENCOUNTER — OUTPATIENT (OUTPATIENT)
Dept: OUTPATIENT SERVICES | Facility: HOSPITAL | Age: 59
LOS: 1 days | End: 2019-06-05
Payer: COMMERCIAL

## 2019-06-05 ENCOUNTER — APPOINTMENT (OUTPATIENT)
Dept: RADIOLOGY | Facility: CLINIC | Age: 59
End: 2019-06-05
Payer: COMMERCIAL

## 2019-06-05 DIAGNOSIS — K90.0 CELIAC DISEASE: ICD-10-CM

## 2019-06-05 DIAGNOSIS — Z00.00 ENCOUNTER FOR GENERAL ADULT MEDICAL EXAMINATION WITHOUT ABNORMAL FINDINGS: ICD-10-CM

## 2019-06-05 PROCEDURE — 77080 DXA BONE DENSITY AXIAL: CPT

## 2019-06-05 PROCEDURE — 77080 DXA BONE DENSITY AXIAL: CPT | Mod: 26

## 2019-06-11 ENCOUNTER — TRANSCRIPTION ENCOUNTER (OUTPATIENT)
Age: 59
End: 2019-06-11

## 2019-06-12 ENCOUNTER — TRANSCRIPTION ENCOUNTER (OUTPATIENT)
Age: 59
End: 2019-06-12

## 2019-06-12 ENCOUNTER — OTHER (OUTPATIENT)
Age: 59
End: 2019-06-12

## 2019-06-19 ENCOUNTER — TRANSCRIPTION ENCOUNTER (OUTPATIENT)
Age: 59
End: 2019-06-19

## 2019-06-21 ENCOUNTER — RX RENEWAL (OUTPATIENT)
Age: 59
End: 2019-06-21

## 2019-06-25 ENCOUNTER — APPOINTMENT (OUTPATIENT)
Dept: DERMATOLOGY | Facility: CLINIC | Age: 59
End: 2019-06-25
Payer: COMMERCIAL

## 2019-06-25 ENCOUNTER — APPOINTMENT (OUTPATIENT)
Dept: ENDOCRINOLOGY | Facility: CLINIC | Age: 59
End: 2019-06-25
Payer: SELF-PAY

## 2019-06-25 PROCEDURE — 99214 OFFICE O/P EST MOD 30 MIN: CPT

## 2019-06-25 PROCEDURE — 76536 US EXAM OF HEAD AND NECK: CPT

## 2019-07-08 ENCOUNTER — APPOINTMENT (OUTPATIENT)
Dept: GASTROENTEROLOGY | Facility: CLINIC | Age: 59
End: 2019-07-08
Payer: COMMERCIAL

## 2019-07-08 ENCOUNTER — TRANSCRIPTION ENCOUNTER (OUTPATIENT)
Age: 59
End: 2019-07-08

## 2019-07-08 VITALS
OXYGEN SATURATION: 90 % | SYSTOLIC BLOOD PRESSURE: 130 MMHG | TEMPERATURE: 98 F | BODY MASS INDEX: 52.26 KG/M2 | WEIGHT: 284 LBS | DIASTOLIC BLOOD PRESSURE: 90 MMHG | HEART RATE: 100 BPM | HEIGHT: 62 IN

## 2019-07-08 PROCEDURE — 99215 OFFICE O/P EST HI 40 MIN: CPT

## 2019-07-08 RX ORDER — PREDNISONE 10 MG/1
10 TABLET ORAL
Qty: 30 | Refills: 0 | Status: DISCONTINUED | COMMUNITY
Start: 2019-04-03 | End: 2019-07-08

## 2019-07-08 NOTE — PHYSICAL EXAM
[General Appearance - Alert] : alert [General Appearance - In No Acute Distress] : in no acute distress [Neck Appearance] : the appearance of the neck was normal [Thyroid Diffuse Enlargement] : the thyroid was not enlarged [Thyroid Nodule] : there were no palpable thyroid nodules [Jugular Venous Distention Increased] : there was no jugular-venous distention [Neck Cervical Mass (___cm)] : no neck mass was observed [Heart Rate And Rhythm] : heart rate was normal and rhythm regular [Auscultation Breath Sounds / Voice Sounds] : lungs were clear to auscultation bilaterally [Heart Sounds Gallop] : no gallops [Heart Sounds] : normal S1 and S2 [Murmurs] : no murmurs [Bowel Sounds] : normal bowel sounds [Edema] : there was no peripheral edema [Heart Sounds Pericardial Friction Rub] : no pericardial rub [Abdomen Soft] : soft [Abdomen Tenderness] : non-tender [Abdomen Mass (___ Cm)] : no abdominal mass palpated [] : no hepato-splenomegaly [No Spinal Tenderness] : no spinal tenderness [No CVA Tenderness] : no ~M costovertebral angle tenderness [Oriented To Time, Place, And Person] : oriented to person, place, and time [Affect] : the affect was normal [Impaired Insight] : insight and judgment were intact [FreeTextEntry1] : obese

## 2019-07-08 NOTE — REASON FOR VISIT
[Initial Evaluation] : an initial evaluation [FreeTextEntry1] : celiac disease, dermatitis herpetiformis, vitamin D deficiency

## 2019-07-08 NOTE — REVIEW OF SYSTEMS
[As Noted in HPI] : as noted in HPI [Recent Weight Gain (___ Lbs)] : recent [unfilled] ~Ulb weight gain [Negative] : Heme/Lymph [FreeTextEntry9] : knee pain

## 2019-07-08 NOTE — HISTORY OF PRESENT ILLNESS
[FreeTextEntry1] : The patient has celiac disease, dermatitis herpetiformis, and gastric ulcers We reviewed the evaluations done since the patient's last visit on May 20, 2019. Blood work from that day revealed elevated anti-deamidated gliadin antibodies both IgG and IgA as well as positive tissue transglutaminase IgG and weakly positive tissue transglutaminase IgA. Vitamin D level was decreased at 19.7. The patient had a bone density scan which revealed evidence of osteoporosis. She has since begun vitamin D 3 2000 units a day and has seen an endocrinologist who repeated a bone density scan with more benign findings. Further workup as being done. The patient has been seen by nutritionist and states that she is on a 100% gluten free diet. She is understanding the concepts of contact and cross contamination. She is being followed by Dr. Mendez for her dermatitis herpetiformis. She reports ongoing lesions. She also complains of oral ulcers which are painful. She denies abdominal pain, bloating, heartburn, dysphagia, nausea, vomiting. Bowel movements are normal. The patient had an H. pylori breath test which was negative.\par \par \par Note from 5/20/19 - The patient is a 58-year-old woman who developed a pruritic rash initially on her hands in March of 2018. 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 The patient was treated with prednisone and hydroxychloroquine. Despite this the rash continued to spread to other parts of her body including her face. She switched to another dermatologist Dr. 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 Biopsies were taken with positive direct immunofluorescence consistent with this diagnosis. The patient has been on dapsone since November but the dose has been increased in March. At that time, the patient was advised to be evaluated for celiac disease. She had no symptoms referrable to the GI tract.\par \par The patient had blood work done in March that showed an elevated tissue transglutaminase IgA of 16.1 and a positive anti-endomysial antibody. She subsequently underwent EGD and colonoscopy on April 4, 2019 by another physician. EGD was significant for a 5 cm hiatal hernia with 3 prepyloric antral ulcers and mild scalloping noted. Biopsies from the duodenum showed mild to moderate villous blunting with minimally increased intraepithelial lymphocytes along with a finding of intestinal metaplasia and H. pylori in the stomach. Colonoscopy was significant only for diverticulosis. She does have a history of colonic polyps. Celiac genetic testing was done showing positive DQ2.  Blood work also showed a mild anemia with a hemoglobin and hematocrit of 10.7 and 34.5 respectively.\par \par The patient has been treated for H. pylori with 2 weeks of amoxicillin, clarithromycin, metronidazole, and pantoprazole.\par \par The patient denies abdominal pain, bloating, heartburn, or dysphagia. She is to solid bowel movements a day without melena or bright red blood per rectum. She has been gaining weight and is gained 10 pounds over the past month. She recently started on a gluten-free diet although she admits that she is cheated. She did develop a new hand lesion yesterday.

## 2019-07-08 NOTE — ASSESSMENT
[FreeTextEntry1] : Patient with celiac disease and dermatitis herpetiformis. She is just recently started on a strict 100% gluten free diet but is now having the assistance of a skilled nutritionist and his understanding the diet better. She has a history of gastric ulcers with H. pylori which has been proven to be eradicated. She has vitamin D deficiency but is started on vitamin D 3 treatment. There was evidence of osteoporosis on bone density scan and the patient is now being followed by an endocrinologist. The patient is followed by Dr. Mendez for her dermatitis herpetiformis.\par \par I had a long discussion with the patient regarding her disease. She understands the need to be strictly gluten free. I also explained to her that resolution of symptoms may take some time.\par \par The patient was advised to followup with all of the practitioners discussed above.\par \par Patient will return to see me in September. At that time, we will schedule an EGD to follow up the gastric ulcers and repeat blood work.\par \par \par Plan from 5/20/19 - Patient with dermatitis herpetiformis with consistent skin biopsies. Because this disease is strongly associated with celiac disease, the patient underwent evaluation for celiac disease. In the process, the patient was found to have 3 prepyloric ulcers with the presence of H. pylori. She is now being treated for H. pylori.\par \par I had a very long conversation with the patient regarding celiac disease, how it was diagnosed, there was manifestations and potential complications, and its treatment. I explained that the diagnosis of celiac disease is made by a combination of multiple studies. Serologies revealed an elevated tissue transglutaminase IgA consistent with celiac disease. Duodenal biopsies showed mild to moderate villous blunting with minimally increased intraepithelial lymphocytes. On his biopsy findings are nonspecific for celiac disease, they are consistent. The patient has positive genetic testing for celiac disease as well. I explained to her that, even with the diagnosis of dermatitis herpetiformis alone but especially in concert with celiac disease, she will need to be on a strict 100% gluten free diet free of contact or cross contamination with gluten for the rest of her life.\par \par Information was given to the patient regarding celiac disease and a gluten free diet. The patient was also given a list of local restaurants that are "celiac friendly".\par \par Bloodwork was sent for CBC, chem-pack, TSH, celiac markers, iron studies, B12, folate, vitamin D, and magnesium.\par \par Patient was sent for a bone density scan.\par \par The patient was given the name of a nutitionist, Siva Johnson, to see.\par \par The patient is currently off of her proton pump inhibitor for 2 weeks and will be going for H. pylori breath test in early June. She will have these results forwarded to me. She will then go back on pantoprazole daily for ulcer healing.\par \par The patient will need a colonoscopy in 3-5 years.\par \par Patient will return to see me in 2 months. She will require eventual EGD to assess for ulcer healing and to evaluate for resolution of the small bowel biopsy findings.

## 2019-07-08 NOTE — CONSULT LETTER
[FreeTextEntry1] : Dear Dr. Preethi Nash and Dr. Bianka Mendez,\par \par I had the pleasure of seeing your patient ELLIOT CRUZ in the office today.  My office note is attached.\par \par Thank you very much for allowing me to participate in the care of your patient.\par \par Sincerely,\par \par Bakari Johnson M.D., FACG, FACP\par Director, Celiac Program at [facility]  of Medicine\par [employer] [facility] Practice Director,\par [facility] - Gastroenterology/Internal Medicine at [place]\[facility] [address]\[place] [address]\par Tel: [phone]\par Email: [email or web address] \par \par The attached note has been created using a voice recognition system (Dragon).  There may be some misspellings and typos.  Please call my office if you have any issues or questions.

## 2019-07-09 ENCOUNTER — RESULT REVIEW (OUTPATIENT)
Age: 59
End: 2019-07-09

## 2019-07-09 ENCOUNTER — TRANSCRIPTION ENCOUNTER (OUTPATIENT)
Age: 59
End: 2019-07-09

## 2019-07-09 ENCOUNTER — CLINICAL ADVICE (OUTPATIENT)
Age: 59
End: 2019-07-09

## 2019-07-11 ENCOUNTER — RX RENEWAL (OUTPATIENT)
Age: 59
End: 2019-07-11

## 2019-07-11 ENCOUNTER — TRANSCRIPTION ENCOUNTER (OUTPATIENT)
Age: 59
End: 2019-07-11

## 2019-07-14 ENCOUNTER — FORM ENCOUNTER (OUTPATIENT)
Age: 59
End: 2019-07-14

## 2019-07-15 ENCOUNTER — APPOINTMENT (OUTPATIENT)
Dept: RADIOLOGY | Facility: CLINIC | Age: 59
End: 2019-07-15
Payer: COMMERCIAL

## 2019-07-15 ENCOUNTER — OUTPATIENT (OUTPATIENT)
Dept: OUTPATIENT SERVICES | Facility: HOSPITAL | Age: 59
LOS: 1 days | End: 2019-07-15
Payer: COMMERCIAL

## 2019-07-15 DIAGNOSIS — M81.0 AGE-RELATED OSTEOPOROSIS WITHOUT CURRENT PATHOLOGICAL FRACTURE: ICD-10-CM

## 2019-07-15 PROCEDURE — 73521 X-RAY EXAM HIPS BI 2 VIEWS: CPT | Mod: 26

## 2019-07-15 PROCEDURE — 73521 X-RAY EXAM HIPS BI 2 VIEWS: CPT

## 2019-07-22 ENCOUNTER — TRANSCRIPTION ENCOUNTER (OUTPATIENT)
Age: 59
End: 2019-07-22

## 2019-07-23 ENCOUNTER — TRANSCRIPTION ENCOUNTER (OUTPATIENT)
Age: 59
End: 2019-07-23

## 2019-07-24 ENCOUNTER — TRANSCRIPTION ENCOUNTER (OUTPATIENT)
Age: 59
End: 2019-07-24

## 2019-07-25 ENCOUNTER — TRANSCRIPTION ENCOUNTER (OUTPATIENT)
Age: 59
End: 2019-07-25

## 2019-07-26 ENCOUNTER — TRANSCRIPTION ENCOUNTER (OUTPATIENT)
Age: 59
End: 2019-07-26

## 2019-07-29 ENCOUNTER — OTHER (OUTPATIENT)
Age: 59
End: 2019-07-29

## 2019-08-06 ENCOUNTER — RESULT REVIEW (OUTPATIENT)
Age: 59
End: 2019-08-06

## 2019-08-06 ENCOUNTER — APPOINTMENT (OUTPATIENT)
Dept: DERMATOLOGY | Facility: CLINIC | Age: 59
End: 2019-08-06
Payer: COMMERCIAL

## 2019-08-06 DIAGNOSIS — L82.1 OTHER SEBORRHEIC KERATOSIS: ICD-10-CM

## 2019-08-06 LAB
BASOPHILS # BLD AUTO: 0.02 K/UL
BASOPHILS NFR BLD AUTO: 0.3 %
EOSINOPHIL # BLD AUTO: 0.43 K/UL
EOSINOPHIL NFR BLD AUTO: 6.1 %
HCT VFR BLD CALC: 33.9 %
HGB BLD-MCNC: 10.6 G/DL
IMM GRANULOCYTES NFR BLD AUTO: 0.3 %
LYMPHOCYTES # BLD AUTO: 1.92 K/UL
LYMPHOCYTES NFR BLD AUTO: 27.4 %
MAN DIFF?: NORMAL
MCHC RBC-ENTMCNC: 31.3 GM/DL
MCHC RBC-ENTMCNC: 32.8 PG
MCV RBC AUTO: 105 FL
MONOCYTES # BLD AUTO: 0.71 K/UL
MONOCYTES NFR BLD AUTO: 10.1 %
NEUTROPHILS # BLD AUTO: 3.92 K/UL
NEUTROPHILS NFR BLD AUTO: 55.8 %
PLATELET # BLD AUTO: 292 K/UL
RBC # BLD: 3.23 M/UL
RBC # BLD: 3.23 M/UL
RBC # FLD: 16.2 %
RETICS # AUTO: 4.9 %
RETICS AGGREG/RBC NFR: 157.9 K/UL
WBC # FLD AUTO: 7.02 K/UL

## 2019-08-06 PROCEDURE — 99214 OFFICE O/P EST MOD 30 MIN: CPT

## 2019-08-06 NOTE — HISTORY OF PRESENT ILLNESS
[FreeTextEntry1] : f/u DH [de-identified] : 58F here in f/u of DH. Since LV, has been on Dapsone 200mg QD with improvement (now x 6 weeks total) and has been periodically using topicals. Feels that her disease is now responding. Has minimal itch. Was trialed on amitriptyline at night for itching but did not tolerate. Otherwise tolerating dapsone well without any motor dysfunction. \par Also curious about a brown spot under the L eye x 6 months. Asymptomatic. No bleeding pain or itch.

## 2019-08-06 NOTE — PHYSICAL EXAM
[Alert] : alert [Well Nourished] : well nourished [Oriented x 3] : ~L oriented x 3 [Conjunctiva Non-injected] : conjunctiva non-injected [No Visual Lymphadenopathy] : no visual  lymphadenopathy [No Clubbing] : no clubbing [No Edema] : no edema [No Chromhidrosis] : no chromhidrosis [No Bromhidrosis] : no bromhidrosis [FreeTextEntry3] : Excoriated papules on the posterior arms\par Isolated pustule on the L alar rim\par Stuck-on brown papule in the L infraorbital region

## 2019-08-15 ENCOUNTER — APPOINTMENT (OUTPATIENT)
Dept: GASTROENTEROLOGY | Facility: AMBULATORY MEDICAL SERVICES | Age: 59
End: 2019-08-15
Payer: COMMERCIAL

## 2019-08-15 PROCEDURE — 43239 EGD BIOPSY SINGLE/MULTIPLE: CPT

## 2019-08-21 ENCOUNTER — APPOINTMENT (OUTPATIENT)
Dept: FAMILY MEDICINE | Facility: CLINIC | Age: 59
End: 2019-08-21
Payer: COMMERCIAL

## 2019-08-21 ENCOUNTER — NON-APPOINTMENT (OUTPATIENT)
Age: 59
End: 2019-08-21

## 2019-08-21 VITALS
HEIGHT: 62 IN | SYSTOLIC BLOOD PRESSURE: 124 MMHG | HEART RATE: 87 BPM | WEIGHT: 280 LBS | DIASTOLIC BLOOD PRESSURE: 80 MMHG | BODY MASS INDEX: 51.53 KG/M2 | OXYGEN SATURATION: 90 %

## 2019-08-21 DIAGNOSIS — Z78.9 OTHER SPECIFIED HEALTH STATUS: ICD-10-CM

## 2019-08-21 PROCEDURE — 99396 PREV VISIT EST AGE 40-64: CPT | Mod: 25

## 2019-08-21 PROCEDURE — 36415 COLL VENOUS BLD VENIPUNCTURE: CPT

## 2019-08-21 PROCEDURE — 93000 ELECTROCARDIOGRAM COMPLETE: CPT

## 2019-08-21 RX ORDER — PREDNISONE 10 MG/1
10 TABLET ORAL
Qty: 9 | Refills: 0 | Status: DISCONTINUED | COMMUNITY
Start: 2019-07-09 | End: 2019-08-21

## 2019-08-21 NOTE — REVIEW OF SYSTEMS
[Joint Pain] : joint pain [Fever] : no fever [Chills] : no chills [Fatigue] : no fatigue [Discharge] : no discharge [Pain] : no pain [Earache] : no earache [Nasal Discharge] : no nasal discharge [Sore Throat] : no sore throat [Chest Pain] : no chest pain [Palpitations] : no palpitations [Lower Ext Edema] : no lower extremity edema [Shortness Of Breath] : no shortness of breath [Wheezing] : no wheezing [Cough] : no cough [Abdominal Pain] : no abdominal pain [Diarrhea] : diarrhea [Vomiting] : no vomiting [Dysuria] : no dysuria [Hematuria] : no hematuria [Muscle Weakness] : no muscle weakness [Muscle Pain] : no muscle pain [Back Pain] : no back pain [Itching] : no itching [Mole Changes] : no mole changes [Headache] : no headache [Dizziness] : no dizziness [Fainting] : no fainting [Suicidal] : not suicidal [Depression] : no depression [Easy Bleeding] : no easy bleeding [Easy Bruising] : no easy bruising [FreeTextEntry9] : KNEE PAIN [de-identified] : SEE HPI

## 2019-08-21 NOTE — HEALTH RISK ASSESSMENT
[Good] : ~his/her~  mood as  good [No] : In the past 12 months have you used drugs other than those required for medical reasons? No [Patient reported mammogram was normal] : Patient reported mammogram was normal [Patient reported PAP Smear was normal] : Patient reported PAP Smear was normal [Patient reported colonoscopy was normal] : Patient reported colonoscopy was normal [Yes] : Yes [Monthly or less (1 pt)] : Monthly or less (1 point) [No falls in past year] : Patient reported no falls in the past year [0] : 2) Feeling down, depressed, or hopeless: Not at all (0) [None] : None [Alone] : lives alone [Employed] : employed [College] : College [Single] : single [Fully functional (bathing, dressing, toileting, transferring, walking, feeding)] : Fully functional (bathing, dressing, toileting, transferring, walking, feeding) [Feels Safe at Home] : Feels safe at home [Fully functional (using the telephone, shopping, preparing meals, housekeeping, doing laundry, using] : Fully functional and needs no help or supervision to perform IADLs (using the telephone, shopping, preparing meals, housekeeping, doing laundry, using transportation, managing medications and managing finances) [Reports normal functional visual acuity (ie: able to read med bottle)] : Reports normal functional visual acuity [Smoke Detector] : smoke detector [Carbon Monoxide Detector] : carbon monoxide detector [Safety elements used in home] : safety elements used in home [Seat Belt] :  uses seat belt [Sunscreen] : uses sunscreen [Name: ___] : Health Care Proxy's Name: [unfilled]  [Relationship: ___] : Relationship: [unfilled] [Patient reported bone density results were normal] : Patient reported bone density results were normal [HIV test declined] : HIV test declined [Hepatitis C test declined] : Hepatitis C test declined [With Patient/Caregiver] : With Patient/Caregiver [Designated Healthcare Proxy] : Designated healthcare proxy [Never (0 pts)] : Never (0 points) [] : No [de-identified] : RARE [Audit-CScore] : 1 [de-identified] : GOOD [[id number]] : 0 [[id number]] : 0 [Change in mental status noted] : No change in mental status noted [Language] : denies difficulty with language [Behavior] : denies difficulty with behavior [Learning/Retaining New Information] : denies difficulty learning/retaining new information [Handling Complex Tasks] : denies difficulty handling complex tasks [Reasoning] : denies difficulty with reasoning [Spatial Ability and Orientation] : denies difficulty with spatial ability and orientation [Reports changes in hearing] : Reports no changes in hearing [Reports changes in vision] : Reports no changes in vision [Reports changes in dental health] : Reports no changes in dental health [MammogramDate] : 07/19 [PapSmearDate] : 06/19 [PapSmearComments] : DR. DONATO [BoneDensityDate] : 06/19 [ColonoscopyDate] : 04/19 [de-identified] : GLASSES FOR DISTANCE AND READING [AdvancecareDate] : 08/19

## 2019-08-21 NOTE — HISTORY OF PRESENT ILLNESS
[FreeTextEntry1] : WELLNESS EXAM.  [de-identified] : MS. CRUZ IS A PLEASANT 57 YO PRESENTING FOR YEARLY WELLNESS EXAM. HAS HAD MULTIPLE SPECIALIST EVALUATION OVER THE PAST YEAR.  SAW DERMATOLOGY AND BEING TREATED FOR DERMATITIS HERPETIFORMIS.  REFERRED TO ORAL SURGERY FOR EVALUATION OF ORAL LESIONS AND "BROWN SPOT" ON TONGUE.  BEING TREAT WITH CPAP FOR SLEEP APNEA.  HAD ENDOSCOPY AND COLONOSCOPY WAS REPORTEDLY DIAGNOSED WITH CELIAC DISEASE AND TREATED FOR H. PYLORI.  IS ACTIVELY TRYING TO LOSE WEIGHT. FOLLOWS WITH A NUTRITIONIST. HAS HAD NO CHEST PAIN, SHORTNESS OF BREATH, HEADACHE, DIZZINESS, GI OR URINARY COMPLAINTS. NO OTHER COMPLAINTS TODAY. \par \par SPECIALISTS:\par GYN: DR. DONATO\par GI: DR. STOVER \par DERMATOLOGY: DR. PINEDA\par ALLERGIST: DR. CHRISTIAN\par RHEUMATOLOGY: DR. RODRIGUES\par ORAL SURGERY: DR. HOYT\par PULMONARY: DR. WREN\par OPHTHALMOLOGY: DR. WILLIG - YEARLY EXAM - APPOINTMENT NEXT WEEK\par ENT: DR. GARZA\par CARDIOLOGY: DR. ARRIAZA

## 2019-08-21 NOTE — PHYSICAL EXAM
[No Acute Distress] : no acute distress [Well-Appearing] : well-appearing [Well Nourished] : well nourished [Normal Sclera/Conjunctiva] : normal sclera/conjunctiva [PERRL] : pupils equal round and reactive to light [EOMI] : extraocular movements intact [Normal Outer Ear/Nose] : the outer ears and nose were normal in appearance [Normal Oropharynx] : the oropharynx was normal [Normal TMs] : both tympanic membranes were normal [No Lymphadenopathy] : no lymphadenopathy [Supple] : supple [No Respiratory Distress] : no respiratory distress  [No Accessory Muscle Use] : no accessory muscle use [Clear to Auscultation] : lungs were clear to auscultation bilaterally [Normal Rate] : normal rate  [Regular Rhythm] : with a regular rhythm [Normal S1, S2] : normal S1 and S2 [No Murmur] : no murmur heard [Pedal Pulses Present] : the pedal pulses are present [No Edema] : there was no peripheral edema [Soft] : abdomen soft [Non Tender] : non-tender [Normal Bowel Sounds] : normal bowel sounds [No Joint Swelling] : no joint swelling [Grossly Normal Strength/Tone] : grossly normal strength/tone [No Rash] : no rash [No Focal Deficits] : no focal deficits [Coordination Grossly Intact] : coordination grossly intact [Normal Gait] : normal gait [Deep Tendon Reflexes (DTR)] : deep tendon reflexes were 2+ and symmetric [Speech Grossly Normal] : speech grossly normal [Normal Mood] : the mood was normal [Normal Insight/Judgement] : insight and judgment were intact [Alert and Oriented x3] : oriented to person, place, and time [de-identified] : BROWN DISCOLORATION CENTER OF TONGUE [de-identified] : DISTAL PULSES INTACT, FULL ROM.

## 2019-08-27 ENCOUNTER — TRANSCRIPTION ENCOUNTER (OUTPATIENT)
Age: 59
End: 2019-08-27

## 2019-08-27 LAB
APPEARANCE: CLEAR
BILIRUBIN URINE: NEGATIVE
BLOOD URINE: NEGATIVE
CHOLEST SERPL-MCNC: 196 MG/DL
CHOLEST/HDLC SERPL: 2.6 RATIO
COLOR: YELLOW
ESTIMATED AVERAGE GLUCOSE: <68 MG/DL
GLUCOSE QUALITATIVE U: NEGATIVE
HBA1C MFR BLD HPLC: <4 %
HDLC SERPL-MCNC: 76 MG/DL
KETONES URINE: NEGATIVE
LDLC SERPL CALC-MCNC: 106 MG/DL
LEUKOCYTE ESTERASE URINE: NEGATIVE
NITRITE URINE: NEGATIVE
PH URINE: 7
PROTEIN URINE: NEGATIVE
SPECIFIC GRAVITY URINE: 1.01
TRIGL SERPL-MCNC: 72 MG/DL
UROBILINOGEN URINE: NORMAL

## 2019-08-29 ENCOUNTER — TRANSCRIPTION ENCOUNTER (OUTPATIENT)
Age: 59
End: 2019-08-29

## 2019-08-29 LAB — 25(OH)D3 SERPL-MCNC: 21.8 NG/ML

## 2019-09-04 ENCOUNTER — TRANSCRIPTION ENCOUNTER (OUTPATIENT)
Age: 59
End: 2019-09-04

## 2019-09-04 ENCOUNTER — OTHER (OUTPATIENT)
Age: 59
End: 2019-09-04

## 2019-10-15 ENCOUNTER — APPOINTMENT (OUTPATIENT)
Dept: DERMATOLOGY | Facility: CLINIC | Age: 59
End: 2019-10-15
Payer: COMMERCIAL

## 2019-10-15 ENCOUNTER — RESULT REVIEW (OUTPATIENT)
Age: 59
End: 2019-10-15

## 2019-10-15 DIAGNOSIS — Z12.83 ENCOUNTER FOR SCREENING FOR MALIGNANT NEOPLASM OF SKIN: ICD-10-CM

## 2019-10-15 DIAGNOSIS — D22.9 MELANOCYTIC NEVI, UNSPECIFIED: ICD-10-CM

## 2019-10-15 LAB
ALBUMIN SERPL ELPH-MCNC: 4.1 G/DL
ALP BLD-CCNC: 93 U/L
ALT SERPL-CCNC: 12 U/L
AST SERPL-CCNC: 17 U/L
BASOPHILS # BLD AUTO: 0.05 K/UL
BASOPHILS NFR BLD AUTO: 0.6 %
BILIRUB DIRECT SERPL-MCNC: 0.2 MG/DL
BILIRUB INDIRECT SERPL-MCNC: 0.4 MG/DL
BILIRUB SERPL-MCNC: 0.6 MG/DL
EOSINOPHIL # BLD AUTO: 0.26 K/UL
EOSINOPHIL NFR BLD AUTO: 3.4 %
HCT VFR BLD CALC: 34.7 %
HGB BLD-MCNC: 10.9 G/DL
IMM GRANULOCYTES NFR BLD AUTO: 0.3 %
LYMPHOCYTES # BLD AUTO: 1.94 K/UL
LYMPHOCYTES NFR BLD AUTO: 25.1 %
MAN DIFF?: NORMAL
MCHC RBC-ENTMCNC: 31.4 GM/DL
MCHC RBC-ENTMCNC: 32.2 PG
MCV RBC AUTO: 102.7 FL
MONOCYTES # BLD AUTO: 0.67 K/UL
MONOCYTES NFR BLD AUTO: 8.7 %
NEUTROPHILS # BLD AUTO: 4.8 K/UL
NEUTROPHILS NFR BLD AUTO: 61.9 %
PLATELET # BLD AUTO: 298 K/UL
PROT SERPL-MCNC: 7.2 G/DL
RBC # BLD: 3.38 M/UL
RBC # BLD: 3.38 M/UL
RBC # FLD: 14.3 %
RETICS # AUTO: 3.7 %
RETICS AGGREG/RBC NFR: 126.4 K/UL
WBC # FLD AUTO: 7.74 K/UL

## 2019-10-15 PROCEDURE — 99214 OFFICE O/P EST MOD 30 MIN: CPT

## 2019-10-15 NOTE — HISTORY OF PRESENT ILLNESS
[FreeTextEntry1] : f/u DH [de-identified] : 58F here in f/u of DH. Since LV, has been on Dapsone 200mg QD with improvement. Now not breaking out. Not itchy. Otherwise tolerating dapsone well without any motor dysfunction or SOB.\par \par Also here for TBSE. Notes a growth on the neck x years. Asymptomatic. No treatments tried.

## 2019-10-15 NOTE — PHYSICAL EXAM
[Alert] : alert [Oriented x 3] : ~L oriented x 3 [Well Nourished] : well nourished [Conjunctiva Non-injected] : conjunctiva non-injected [No Clubbing] : no clubbing [No Visual Lymphadenopathy] : no visual  lymphadenopathy [No Bromhidrosis] : no bromhidrosis [No Edema] : no edema [No Chromhidrosis] : no chromhidrosis [FreeTextEntry3] : The patient is well-appearing, in no acute distress, alert and oriented x 3. Mood and affect are normal. A complete cutaneous examination of the scalp, face, neck, chest, abdomen, back, bilateral arms, bilateral legs, buttocks, digits, nails, eyelids, conjunctiva and lips reveals the following significant findings:\par -Excoriated papules and scars on the arms and back.\par -Neck with well-demarcated brown papule

## 2019-10-17 ENCOUNTER — APPOINTMENT (OUTPATIENT)
Dept: GASTROENTEROLOGY | Facility: CLINIC | Age: 59
End: 2019-10-17
Payer: COMMERCIAL

## 2019-10-17 VITALS
HEIGHT: 62 IN | HEART RATE: 71 BPM | BODY MASS INDEX: 52.26 KG/M2 | TEMPERATURE: 98.3 F | OXYGEN SATURATION: 90 % | WEIGHT: 284 LBS | DIASTOLIC BLOOD PRESSURE: 80 MMHG | SYSTOLIC BLOOD PRESSURE: 120 MMHG

## 2019-10-17 DIAGNOSIS — K44.9 DIAPHRAGMATIC HERNIA W/OUT OBSTRUCTION OR GANGRENE: ICD-10-CM

## 2019-10-17 DIAGNOSIS — K29.70 GASTRITIS, UNSPECIFIED, W/OUT BLEEDING: ICD-10-CM

## 2019-10-17 PROCEDURE — 36415 COLL VENOUS BLD VENIPUNCTURE: CPT

## 2019-10-17 PROCEDURE — 99215 OFFICE O/P EST HI 40 MIN: CPT | Mod: 25

## 2019-10-18 NOTE — PHYSICAL EXAM
[General Appearance - In No Acute Distress] : in no acute distress [Neck Appearance] : the appearance of the neck was normal [General Appearance - Alert] : alert [Neck Cervical Mass (___cm)] : no neck mass was observed [Jugular Venous Distention Increased] : there was no jugular-venous distention [Thyroid Diffuse Enlargement] : the thyroid was not enlarged [Thyroid Nodule] : there were no palpable thyroid nodules [Auscultation Breath Sounds / Voice Sounds] : lungs were clear to auscultation bilaterally [Heart Sounds Gallop] : no gallops [Heart Sounds] : normal S1 and S2 [Heart Rate And Rhythm] : heart rate was normal and rhythm regular [Heart Sounds Pericardial Friction Rub] : no pericardial rub [Murmurs] : no murmurs [Edema] : there was no peripheral edema [Bowel Sounds] : normal bowel sounds [Abdomen Soft] : soft [Abdomen Tenderness] : non-tender [] : no hepato-splenomegaly [Abdomen Mass (___ Cm)] : no abdominal mass palpated [No CVA Tenderness] : no ~M costovertebral angle tenderness [No Spinal Tenderness] : no spinal tenderness [Oriented To Time, Place, And Person] : oriented to person, place, and time [Impaired Insight] : insight and judgment were intact [Affect] : the affect was normal [FreeTextEntry1] : obese

## 2019-10-18 NOTE — ASSESSMENT
[FreeTextEntry1] : Patient with celiac disease and dermatitis herpetiformis.  Her endoscopy still shows partial villous atrophy but she has now been on a much more strict gluten-free diet.  She has osteoporosis.  An ENT doctor recently diagnosed her with reflux as the cause of her cough and throat clearing and started her on omeprazole 40 mg a day.\par \par Patient will continue a strict 100% gluten-free diet.\par \par Bloodwork was sent for CBC, chem-pack, TSH, celiac markers, iron studies, B12, folate, vitamin D, and magnesium.\par \par Patient was advised to see an endocrinologist regarding treatment of her osteoporosis.\par \par Patient was advised to take vitamin D3 2000 international units a day.\par \par Patient will continue omeprazole 40 mg a day and we will observe for improvement of her symptoms of cough and throat clearing.  She does have a large hiatal hernia.\par \par Patient will return to see me in 3 months.\par \par \par Plan from 7/8/2019 - Patient with celiac disease and dermatitis herpetiformis. She is just recently started on a strict 100% gluten free diet but is now having the assistance of a skilled nutritionist and his understanding the diet better. She has a history of gastric ulcers with H. pylori which has been proven to be eradicated. She has vitamin D deficiency but is started on vitamin D 3 treatment. There was evidence of osteoporosis on bone density scan and the patient is now being followed by an endocrinologist. The patient is followed by Dr. Mendez for her dermatitis herpetiformis.\par \par I had a long discussion with the patient regarding her disease. She understands the need to be strictly gluten free. I also explained to her that resolution of symptoms may take some time.\par \par The patient was advised to followup with all of the practitioners discussed above.\par \par Patient will return to see me in September. At that time, we will schedule an EGD to follow up the gastric ulcers and repeat blood work.\par \par \par Plan from 5/20/19 - Patient with dermatitis herpetiformis with consistent skin biopsies. Because this disease is strongly associated with celiac disease, the patient underwent evaluation for celiac disease. In the process, the patient was found to have 3 prepyloric ulcers with the presence of H. pylori. She is now being treated for H. pylori.\par \par I had a very long conversation with the patient regarding celiac disease, how it was diagnosed, there was manifestations and potential complications, and its treatment. I explained that the diagnosis of celiac disease is made by a combination of multiple studies. Serologies revealed an elevated tissue transglutaminase IgA consistent with celiac disease. Duodenal biopsies showed mild to moderate villous blunting with minimally increased intraepithelial lymphocytes. On his biopsy findings are nonspecific for celiac disease, they are consistent. The patient has positive genetic testing for celiac disease as well. I explained to her that, even with the diagnosis of dermatitis herpetiformis alone but especially in concert with celiac disease, she will need to be on a strict 100% gluten free diet free of contact or cross contamination with gluten for the rest of her life.\par \par Information was given to the patient regarding celiac disease and a gluten free diet. The patient was also given a list of local restaurants that are "celiac friendly".\par \par Bloodwork was sent for CBC, chem-pack, TSH, celiac markers, iron studies, B12, folate, vitamin D, and magnesium.\par \par Patient was sent for a bone density scan.\par \par The patient was given the name of a nutitionist, Siva Johnson, to see.\par \par The patient is currently off of her proton pump inhibitor for 2 weeks and will be going for H. pylori breath test in early June. She will have these results forwarded to me. She will then go back on pantoprazole daily for ulcer healing.\par \par The patient will need a colonoscopy in 3-5 years.\par \par Patient will return to see me in 2 months. She will require eventual EGD to assess for ulcer healing and to evaluate for resolution of the small bowel biopsy findings.

## 2019-10-18 NOTE — HISTORY OF PRESENT ILLNESS
[FreeTextEntry1] : The patient has celiac disease, dermatitis herpetiformis, and osteoporosis.  The patient is status post EGD performed on August 15, 2019.  EGD revealed a 5 cm hiatal hernia with mild erosive gastritis and flat duodenal bulb mucosa.  Biopsies from the distal duodenum showed normal villi with mildly increased intraepithelial lymphocytes but biopsies from the duodenal bulb continue to show partial villous atrophy also with increased intraepithelial lymphocytes.  The patient has been seeing Siva Johnson for nutritional counseling and reports being on a 100% gluten-free diet.  She has had no further itching or rash from the dermatitis herpetiformis and her dapsone dosage was decreased from 200-1 50.  She denies abdominal pain, bloating, heartburn, dysphasia.  Bowel movements are normal without diarrhea, constipation, melena, bright red blood per rectum.  The patient has had a cough and throat clearing.  She saw an ENT doctor who diagnosed her with reflux and placed her on omeprazole 40 mg a day which she has been taking since Friday.\par \par \par Note from 7/8/2019 - The patient has celiac disease, dermatitis herpetiformis, and gastric ulcers We reviewed the evaluations done since the patient's last visit on May 20, 2019. Blood work from that day revealed elevated anti-deamidated gliadin antibodies both IgG and IgA as well as positive tissue transglutaminase IgG and weakly positive tissue transglutaminase IgA. Vitamin D level was decreased at 19.7. The patient had a bone density scan which revealed evidence of osteoporosis. She has since begun vitamin D 3 2000 units a day and has seen an endocrinologist who repeated a bone density scan with more benign findings. Further workup as being done. The patient has been seen by nutritionist and states that she is on a 100% gluten free diet. She is understanding the concepts of contact and cross contamination. She is being followed by Dr. Mendez for her dermatitis herpetiformis. She reports ongoing lesions. She also complains of oral ulcers which are painful. She denies abdominal pain, bloating, heartburn, dysphagia, nausea, vomiting. Bowel movements are normal. The patient had an H. pylori breath test which was negative.\par \par \par Note from 5/20/19 - The patient is a 58-year-old woman who developed a pruritic rash initially on her hands in March of 2018. 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 The patient was treated with prednisone and hydroxychloroquine. Despite this the rash continued to spread to other parts of her body including her face. She switched to another dermatologist Dr. 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 Biopsies were taken with positive direct immunofluorescence consistent with this diagnosis. The patient has been on dapsone since November but the dose has been increased in March. At that time, the patient was advised to be evaluated for celiac disease. She had no symptoms referrable to the GI tract.\par \par The patient had blood work done in March that showed an elevated tissue transglutaminase IgA of 16.1 and a positive anti-endomysial antibody. She subsequently underwent EGD and colonoscopy on April 4, 2019 by another physician. EGD was significant for a 5 cm hiatal hernia with 3 prepyloric antral ulcers and mild scalloping noted. Biopsies from the duodenum showed mild to moderate villous blunting with minimally increased intraepithelial lymphocytes along with a finding of intestinal metaplasia and H. pylori in the stomach. Colonoscopy was significant only for diverticulosis. She does have a history of colonic polyps. Celiac genetic testing was done showing positive DQ2.  Blood work also showed a mild anemia with a hemoglobin and hematocrit of 10.7 and 34.5 respectively.\par \par The patient has been treated for H. pylori with 2 weeks of amoxicillin, clarithromycin, metronidazole, and pantoprazole.\par \par The patient denies abdominal pain, bloating, heartburn, or dysphagia. She is to solid bowel movements a day without melena or bright red blood per rectum. She has been gaining weight and is gained 10 pounds over the past month. She recently started on a gluten-free diet although she admits that she is cheated. She did develop a new hand lesion yesterday.

## 2019-10-18 NOTE — CONSULT LETTER
[FreeTextEntry1] : Dear Dr. Preethi Nash and Dr. Bianka Mendez,\par \par I had the pleasure of seeing your patient ELLIOT CRUZ in the office today.  My office note is attached.\par \par Thank you very much for allowing me to participate in the care of your patient.\par \par Sincerely,\par \par Bakari Johnson M.D., FACG, FACP\par Director, Celiac Program at [facility]  of Medicine\par [employer] at [facility] Practice Director,\par [facility] - Gastroenterology/Internal Medicine at [place]\[facility] [address]\[place] [address]\par Tel: [phone]\par Email: [email or web address]\par \par \par The attached note has been created using a voice recognition system ([email or web address]).  There may be some misspellings and typos.  Please call my office if you have any issues or questions.

## 2019-10-18 NOTE — REASON FOR VISIT
[Follow-Up: _____] : a [unfilled] follow-up visit [FreeTextEntry1] : celiac disease, dermatitis herpetiformis, vitamin D deficiency, osteoporosis

## 2019-10-21 ENCOUNTER — TRANSCRIPTION ENCOUNTER (OUTPATIENT)
Age: 59
End: 2019-10-21

## 2019-10-21 LAB
25(OH)D3 SERPL-MCNC: 30.3 NG/ML
ALBUMIN SERPL ELPH-MCNC: 4.4 G/DL
ALP BLD-CCNC: 98 U/L
ALT SERPL-CCNC: 15 U/L
ANION GAP SERPL CALC-SCNC: 11 MMOL/L
AST SERPL-CCNC: 20 U/L
BASOPHILS # BLD AUTO: 0.05 K/UL
BASOPHILS NFR BLD AUTO: 0.5 %
BILIRUB SERPL-MCNC: 0.6 MG/DL
BUN SERPL-MCNC: 11 MG/DL
CALCIUM SERPL-MCNC: 10.1 MG/DL
CHLORIDE SERPL-SCNC: 105 MMOL/L
CO2 SERPL-SCNC: 25 MMOL/L
CREAT SERPL-MCNC: 0.76 MG/DL
ENDOMYSIUM IGA SER QL: NEGATIVE
ENDOMYSIUM IGA TITR SER: NORMAL
EOSINOPHIL # BLD AUTO: 0.25 K/UL
EOSINOPHIL NFR BLD AUTO: 2.5 %
FERRITIN SERPL-MCNC: 40 NG/ML
FOLATE SERPL-MCNC: 16.9 NG/ML
GLIADIN IGA SER QL: 26.7 UNITS
GLIADIN IGG SER QL: 54.4 UNITS
GLIADIN PEPTIDE IGA SER-ACNC: ABNORMAL
GLIADIN PEPTIDE IGG SER-ACNC: POSITIVE
GLUCOSE SERPL-MCNC: 96 MG/DL
HCT VFR BLD CALC: 37.6 %
HGB BLD-MCNC: 11.5 G/DL
IGA SER QL IEP: 156 MG/DL
IMM GRANULOCYTES NFR BLD AUTO: 0.3 %
IRON SATN MFR SERPL: 17 %
IRON SERPL-MCNC: 72 UG/DL
LYMPHOCYTES # BLD AUTO: 2.76 K/UL
LYMPHOCYTES NFR BLD AUTO: 27.5 %
MAGNESIUM SERPL-MCNC: 2.1 MG/DL
MAN DIFF?: NORMAL
MCHC RBC-ENTMCNC: 30.6 GM/DL
MCHC RBC-ENTMCNC: 32 PG
MCV RBC AUTO: 104.7 FL
MONOCYTES # BLD AUTO: 0.66 K/UL
MONOCYTES NFR BLD AUTO: 6.6 %
NEUTROPHILS # BLD AUTO: 6.27 K/UL
NEUTROPHILS NFR BLD AUTO: 62.6 %
PLATELET # BLD AUTO: 326 K/UL
POTASSIUM SERPL-SCNC: 4.7 MMOL/L
PROT SERPL-MCNC: 8.1 G/DL
RBC # BLD: 3.59 M/UL
RBC # FLD: 14.6 %
SODIUM SERPL-SCNC: 141 MMOL/L
TIBC SERPL-MCNC: 426 UG/DL
TSH SERPL-ACNC: 2.38 UIU/ML
TTG IGA SER IA-ACNC: 3.9 U/ML
TTG IGA SER-ACNC: NEGATIVE
TTG IGG SER IA-ACNC: 5.5 U/ML
TTG IGG SER IA-ACNC: NEGATIVE
UIBC SERPL-MCNC: 354 UG/DL
VIT B12 SERPL-MCNC: 980 PG/ML
WBC # FLD AUTO: 10.02 K/UL

## 2019-10-22 ENCOUNTER — TRANSCRIPTION ENCOUNTER (OUTPATIENT)
Age: 59
End: 2019-10-22

## 2019-11-01 ENCOUNTER — OTHER (OUTPATIENT)
Age: 59
End: 2019-11-01

## 2019-12-10 ENCOUNTER — APPOINTMENT (OUTPATIENT)
Dept: PULMONOLOGY | Facility: CLINIC | Age: 59
End: 2019-12-10

## 2019-12-16 ENCOUNTER — TRANSCRIPTION ENCOUNTER (OUTPATIENT)
Age: 59
End: 2019-12-16

## 2019-12-17 ENCOUNTER — TRANSCRIPTION ENCOUNTER (OUTPATIENT)
Age: 59
End: 2019-12-17

## 2019-12-18 ENCOUNTER — TRANSCRIPTION ENCOUNTER (OUTPATIENT)
Age: 59
End: 2019-12-18

## 2019-12-19 ENCOUNTER — TRANSCRIPTION ENCOUNTER (OUTPATIENT)
Age: 59
End: 2019-12-19

## 2019-12-20 ENCOUNTER — TRANSCRIPTION ENCOUNTER (OUTPATIENT)
Age: 59
End: 2019-12-20

## 2019-12-30 ENCOUNTER — APPOINTMENT (OUTPATIENT)
Dept: PHYSICAL MEDICINE AND REHAB | Facility: CLINIC | Age: 59
End: 2019-12-30
Payer: COMMERCIAL

## 2019-12-30 VITALS
HEART RATE: 82 BPM | DIASTOLIC BLOOD PRESSURE: 96 MMHG | HEIGHT: 62 IN | BODY MASS INDEX: 52.26 KG/M2 | WEIGHT: 284 LBS | SYSTOLIC BLOOD PRESSURE: 143 MMHG

## 2019-12-30 DIAGNOSIS — M70.61 TROCHANTERIC BURSITIS, RIGHT HIP: ICD-10-CM

## 2019-12-30 DIAGNOSIS — M76.31 ILIOTIBIAL BAND SYNDROME, RIGHT LEG: ICD-10-CM

## 2019-12-30 DIAGNOSIS — M25.562 PAIN IN LEFT KNEE: ICD-10-CM

## 2019-12-30 DIAGNOSIS — Z96.659 PRESENCE OF UNSPECIFIED ARTIFICIAL KNEE JOINT: ICD-10-CM

## 2019-12-30 PROCEDURE — 99204 OFFICE O/P NEW MOD 45 MIN: CPT

## 2019-12-30 NOTE — HISTORY OF PRESENT ILLNESS
[FreeTextEntry1] : 58 yo F with PMH osteoporosis, celiac disease, left TKR 2013 who presents with bilateral knee and right hip pain.\par \par Onset: Patient reports chronic bilateral knee pain s/p several surgeries.  Patient reports knee pain is chronic and she has learned to tolerate this pain and adapt her activities.  However, patient reports that right hip pain has been present for less than a year and is concerning her the most at this visit.  No inciting events, trauma, or falls.\par Location: right lateral hip\par Characteristics: sharp\par Aggravating factors: prolonged standing, walking\par Alleviating factors: rest\par Radiation: right lateral thigh\par Treatments: patient has tried volteran gel for bilateral knees but no treatment for right hip.\par Severity: 7-8/10\par \par Diagnostic studies:\par XR right hip unremarkable, XR bilateral knee unremarkable.\par No previous EMG/NCS.\par \par Patient denies new weakness, numbness or paresthesia.  Patient denies bowel/bladder dysfunction, fevers, chills, weight loss, night pain, or night sweats.\par

## 2019-12-30 NOTE — ASSESSMENT
[FreeTextEntry1] : 59 year old female with left knee pain s/p total knee replacement and right hip pain likely secondary to right GT bursitis and right ITB syndrome.\par \par -XR bilateral knee and right hip reviewed\par -Start PT and HEP for right hip pain\par -Start volteran gel for right hip\par -Start ice prn\par -Patient with a history of osteoporosis and bilateral ankle stress fractures.  Will be judicious with the use of injected and PO corticosteroids.\par -Form for handicap placard completed at this visit.\par -RTC 2-3 weeks, if pain persists or worsen despite compliance with above, then will consider right GT bursa injection vs. MRI

## 2019-12-30 NOTE — PHYSICAL EXAM
Done as requested per Dr. De Luna. I left a message on [employer] voicemail in [place].    [FreeTextEntry1] : Gen: NAD\par HEENT: neck supple\par CV: no cyanosis\par Pulm: breathing well on room air\par Abd: soft\par Right hip: ROM limited in all planes secondary to pain, tenderness over greater trochanter and ITB, neg scour, neg yanet, neg joshua\par Left knee: surgical scar appears c/d/i, ROM limited in flexion, neg mitra\par Right knee: surgical scar appears c/d/i, ROM limited in flexion, neg mitra\par Msk: \par 5/5 hip flexion B/L, 5/5 knee extension B/L, 5/5 knee flexion B/L, 5/5 dorsiflexion B/L, 5/5 plantar flexion B/L\par 5/5 shoulder abduction B/L, 5/5 elbow flexion B/L, 5/5 elbow extension B/L, 5/5 wrist extension B/L, 5/5 hand  B/L\par Neuro: sensation intact to light touch in bilateral upper and lower extremities, reflexes 2+ brachioradialis, biceps, triceps bilaterally, reflexes 2+ patella, medial hamstring, achilles bilaterally, negative babinski, negative childers\par

## 2020-01-14 ENCOUNTER — APPOINTMENT (OUTPATIENT)
Dept: DERMATOLOGY | Facility: CLINIC | Age: 60
End: 2020-01-14
Payer: COMMERCIAL

## 2020-01-14 LAB
ALBUMIN SERPL ELPH-MCNC: 4.1 G/DL
ALP BLD-CCNC: 92 U/L
ALT SERPL-CCNC: 15 U/L
AST SERPL-CCNC: 18 U/L
BASOPHILS # BLD AUTO: 0.03 K/UL
BASOPHILS NFR BLD AUTO: 0.4 %
BILIRUB DIRECT SERPL-MCNC: 0.2 MG/DL
BILIRUB INDIRECT SERPL-MCNC: 0.4 MG/DL
BILIRUB SERPL-MCNC: 0.6 MG/DL
EOSINOPHIL # BLD AUTO: 0.19 K/UL
EOSINOPHIL NFR BLD AUTO: 2.6 %
HCT VFR BLD CALC: 34.7 %
HGB BLD-MCNC: 10.7 G/DL
IMM GRANULOCYTES NFR BLD AUTO: 0.3 %
LYMPHOCYTES # BLD AUTO: 1.72 K/UL
LYMPHOCYTES NFR BLD AUTO: 23.7 %
MAN DIFF?: NORMAL
MCHC RBC-ENTMCNC: 30.8 GM/DL
MCHC RBC-ENTMCNC: 32.3 PG
MCV RBC AUTO: 104.8 FL
MONOCYTES # BLD AUTO: 0.65 K/UL
MONOCYTES NFR BLD AUTO: 8.9 %
NEUTROPHILS # BLD AUTO: 4.66 K/UL
NEUTROPHILS NFR BLD AUTO: 64.1 %
PLATELET # BLD AUTO: 296 K/UL
PROT SERPL-MCNC: 7.4 G/DL
RBC # BLD: 3.31 M/UL
RBC # BLD: 3.31 M/UL
RBC # FLD: 15.5 %
RETICS # AUTO: 4.4 %
RETICS AGGREG/RBC NFR: 144.3 K/UL
WBC # FLD AUTO: 7.27 K/UL

## 2020-01-14 PROCEDURE — 99214 OFFICE O/P EST MOD 30 MIN: CPT

## 2020-01-14 NOTE — PHYSICAL EXAM
[Alert] : alert [Oriented x 3] : ~L oriented x 3 [Well Nourished] : well nourished [Conjunctiva Non-injected] : conjunctiva non-injected [No Clubbing] : no clubbing [No Visual Lymphadenopathy] : no visual  lymphadenopathy [No Bromhidrosis] : no bromhidrosis [No Edema] : no edema [No Chromhidrosis] : no chromhidrosis [FreeTextEntry3] : Few erythematous papules and punctate erosions on the R forearm\par Otherwise clear

## 2020-01-14 NOTE — HISTORY OF PRESENT ILLNESS
[de-identified] : 59F here in f/u of DH. Since LV, has self-tapered to Dapsone 100mg QD with good effect. Periodically increases to 200mg QD x 1-2 weeks during flare. Has a focal area of ongoing activity on the R forearm. Recently stressed as father was recently placed into Hospice. Otherwise tolerating dapsone well without any motor dysfunction or SOB. [FreeTextEntry1] : f/u DH

## 2020-01-23 ENCOUNTER — TRANSCRIPTION ENCOUNTER (OUTPATIENT)
Age: 60
End: 2020-01-23

## 2020-02-24 ENCOUNTER — APPOINTMENT (OUTPATIENT)
Dept: PHYSICAL MEDICINE AND REHAB | Facility: CLINIC | Age: 60
End: 2020-02-24

## 2020-02-27 ENCOUNTER — APPOINTMENT (OUTPATIENT)
Dept: PULMONOLOGY | Facility: CLINIC | Age: 60
End: 2020-02-27

## 2020-03-07 ENCOUNTER — RX RENEWAL (OUTPATIENT)
Age: 60
End: 2020-03-07

## 2020-04-24 ENCOUNTER — TRANSCRIPTION ENCOUNTER (OUTPATIENT)
Age: 60
End: 2020-04-24

## 2020-04-27 ENCOUNTER — APPOINTMENT (OUTPATIENT)
Dept: DERMATOLOGY | Facility: CLINIC | Age: 60
End: 2020-04-27
Payer: COMMERCIAL

## 2020-04-27 PROCEDURE — 99214 OFFICE O/P EST MOD 30 MIN: CPT | Mod: 95

## 2020-04-30 LAB
ALBUMIN SERPL ELPH-MCNC: 4.1 G/DL
ALP BLD-CCNC: 92 U/L
ALT SERPL-CCNC: 14 U/L
ANION GAP SERPL CALC-SCNC: 12 MMOL/L
AST SERPL-CCNC: 19 U/L
BASOPHILS # BLD AUTO: 0.03 K/UL
BASOPHILS NFR BLD AUTO: 0.4 %
BILIRUB SERPL-MCNC: 0.6 MG/DL
BUN SERPL-MCNC: 9 MG/DL
CALCIUM SERPL-MCNC: 10.1 MG/DL
CHLORIDE SERPL-SCNC: 104 MMOL/L
CO2 SERPL-SCNC: 27 MMOL/L
CREAT SERPL-MCNC: 0.74 MG/DL
EOSINOPHIL # BLD AUTO: 0.26 K/UL
EOSINOPHIL NFR BLD AUTO: 3.1 %
GLUCOSE SERPL-MCNC: 99 MG/DL
HCT VFR BLD CALC: 35.3 %
HGB BLD-MCNC: 10.5 G/DL
IMM GRANULOCYTES NFR BLD AUTO: 0.5 %
LYMPHOCYTES # BLD AUTO: 2.62 K/UL
LYMPHOCYTES NFR BLD AUTO: 31.2 %
MAN DIFF?: NORMAL
MCHC RBC-ENTMCNC: 29.7 GM/DL
MCHC RBC-ENTMCNC: 31.8 PG
MCV RBC AUTO: 107 FL
MONOCYTES # BLD AUTO: 0.71 K/UL
MONOCYTES NFR BLD AUTO: 8.5 %
NEUTROPHILS # BLD AUTO: 4.73 K/UL
NEUTROPHILS NFR BLD AUTO: 56.3 %
PLATELET # BLD AUTO: 282 K/UL
POTASSIUM SERPL-SCNC: 4.1 MMOL/L
PROT SERPL-MCNC: 7.5 G/DL
RBC # BLD: 3.3 M/UL
RBC # BLD: 3.3 M/UL
RBC # FLD: 17.6 %
RETICS # AUTO: 5.7 %
RETICS AGGREG/RBC NFR: 187.8 K/UL
SODIUM SERPL-SCNC: 142 MMOL/L
WBC # FLD AUTO: 8.39 K/UL

## 2020-05-14 ENCOUNTER — APPOINTMENT (OUTPATIENT)
Dept: ENDOCRINOLOGY | Facility: CLINIC | Age: 60
End: 2020-05-14

## 2020-06-16 ENCOUNTER — APPOINTMENT (OUTPATIENT)
Dept: DERMATOLOGY | Facility: CLINIC | Age: 60
End: 2020-06-16
Payer: COMMERCIAL

## 2020-06-16 VITALS — TEMPERATURE: 97.3 F

## 2020-06-16 VITALS — HEIGHT: 62 IN | BODY MASS INDEX: 51.9 KG/M2 | WEIGHT: 282.06 LBS

## 2020-06-16 PROCEDURE — 99214 OFFICE O/P EST MOD 30 MIN: CPT

## 2020-06-23 ENCOUNTER — LABORATORY RESULT (OUTPATIENT)
Age: 60
End: 2020-06-23

## 2020-06-23 ENCOUNTER — APPOINTMENT (OUTPATIENT)
Dept: GASTROENTEROLOGY | Facility: CLINIC | Age: 60
End: 2020-06-23
Payer: COMMERCIAL

## 2020-06-23 VITALS
WEIGHT: 283 LBS | TEMPERATURE: 98.4 F | OXYGEN SATURATION: 91 % | BODY MASS INDEX: 52.08 KG/M2 | HEART RATE: 73 BPM | DIASTOLIC BLOOD PRESSURE: 84 MMHG | SYSTOLIC BLOOD PRESSURE: 120 MMHG | HEIGHT: 62 IN

## 2020-06-23 PROCEDURE — 36415 COLL VENOUS BLD VENIPUNCTURE: CPT

## 2020-06-23 PROCEDURE — 99215 OFFICE O/P EST HI 40 MIN: CPT | Mod: 25

## 2020-06-23 RX ORDER — OMEPRAZOLE 40 MG/1
40 CAPSULE, DELAYED RELEASE ORAL
Refills: 0 | Status: DISCONTINUED | COMMUNITY
End: 2020-06-23

## 2020-06-23 RX ORDER — PANTOPRAZOLE 40 MG/1
40 TABLET, DELAYED RELEASE ORAL
Qty: 30 | Refills: 2 | Status: DISCONTINUED | COMMUNITY
Start: 2019-04-04 | End: 2020-06-23

## 2020-06-24 NOTE — REVIEW OF SYSTEMS
[Recent Weight Gain (___ Lbs)] : recent [unfilled] ~Ulb weight gain [As Noted in HPI] : as noted in HPI [Negative] : Endocrine [FreeTextEntry9] : knee pain

## 2020-06-24 NOTE — ASSESSMENT
[FreeTextEntry1] : Patient with celiac disease and dermatitis herpetiformis.  She reports being on a strictly gluten-free diet but has had 2 outbreaks of dermatitis herpetiformis and is currently on a prednisone taper and dapsone.  She has osteoporosis and is to see an endocrinologist.  She has a history of colonic polyps.\par \par I had a long discussion with the patient regarding her celiac disease.  She was advised to continue the gluten-free diet and to follow-up with her nutritionist.\par \par Bloodwork was sent for CBC, chem-pack, TSH, celiac markers, iron studies, B12, folate, vitamin A, vitamin D, vitamin K, magnesium, carotene, vitamin B6, zinc.\par \par I advised the patient that we would hope to see normalization of the celiac serologies at this point.  We will need to repeat an endoscopy in the near future to ultimately assess response as she had ongoing partial villous atrophy on her last endoscopy.\par \par Patient will return to see me in 3 months.\par \par Patient is due for colonoscopy in April 2022.\par \par \par Plan from 10/17/2019 - Patient with celiac disease and dermatitis herpetiformis.  Her endoscopy still shows partial villous atrophy but she has now been on a much more strict gluten-free diet.  She has osteoporosis.  An ENT doctor recently diagnosed her with reflux as the cause of her cough and throat clearing and started her on omeprazole 40 mg a day.\par \par Patient will continue a strict 100% gluten-free diet.\par \par Bloodwork was sent for CBC, chem-pack, TSH, celiac markers, iron studies, B12, folate, vitamin D, and magnesium.\par \par Patient was advised to see an endocrinologist regarding treatment of her osteoporosis.\par \par Patient was advised to take vitamin D3 2000 international units a day.\par \par Patient will continue omeprazole 40 mg a day and we will observe for improvement of her symptoms of cough and throat clearing.  She does have a large hiatal hernia.\par \par Patient will return to see me in 3 months.\par \par \par Plan from 7/8/2019 - Patient with celiac disease and dermatitis herpetiformis. She is just recently started on a strict 100% gluten free diet but is now having the assistance of a skilled nutritionist and his understanding the diet better. She has a history of gastric ulcers with H. pylori which has been proven to be eradicated. She has vitamin D deficiency but is started on vitamin D 3 treatment. There was evidence of osteoporosis on bone density scan and the patient is now being followed by an endocrinologist. The patient is followed by Dr. Mendez for her dermatitis herpetiformis.\par \par I had a long discussion with the patient regarding her disease. She understands the need to be strictly gluten free. I also explained to her that resolution of symptoms may take some time.\par \par The patient was advised to followup with all of the practitioners discussed above.\par \par Patient will return to see me in September. At that time, we will schedule an EGD to follow up the gastric ulcers and repeat blood work.\par \par \par Plan from 5/20/19 - Patient with dermatitis herpetiformis with consistent skin biopsies. Because this disease is strongly associated with celiac disease, the patient underwent evaluation for celiac disease. In the process, the patient was found to have 3 prepyloric ulcers with the presence of H. pylori. She is now being treated for H. pylori.\par \par I had a very long conversation with the patient regarding celiac disease, how it was diagnosed, there was manifestations and potential complications, and its treatment. I explained that the diagnosis of celiac disease is made by a combination of multiple studies. Serologies revealed an elevated tissue transglutaminase IgA consistent with celiac disease. Duodenal biopsies showed mild to moderate villous blunting with minimally increased intraepithelial lymphocytes. On his biopsy findings are nonspecific for celiac disease, they are consistent. The patient has positive genetic testing for celiac disease as well. I explained to her that, even with the diagnosis of dermatitis herpetiformis alone but especially in concert with celiac disease, she will need to be on a strict 100% gluten free diet free of contact or cross contamination with gluten for the rest of her life.\par \par Information was given to the patient regarding celiac disease and a gluten free diet. The patient was also given a list of local restaurants that are "celiac friendly".\par \par Bloodwork was sent for CBC, chem-pack, TSH, celiac markers, iron studies, B12, folate, vitamin D, and magnesium.\par \par Patient was sent for a bone density scan.\par \par The patient was given the name of a nutitionist, Siva Johnson, to see.\par \par The patient is currently off of her proton pump inhibitor for 2 weeks and will be going for H. pylori breath test in early June. She will have these results forwarded to me. She will then go back on pantoprazole daily for ulcer healing.\par \par The patient will need a colonoscopy in 3-5 years.\par \par Patient will return to see me in 2 months. She will require eventual EGD to assess for ulcer healing and to evaluate for resolution of the small bowel biopsy findings.

## 2020-06-24 NOTE — HISTORY OF PRESENT ILLNESS
[FreeTextEntry1] : The patient has celiac disease and dermatitis herpetiformis.  Her last blood work done in October revealed celiac labs that were improving but not yet normal with a positive delaminated gliadin antibody IgG of 54.4 and a weakly positive IgA of 26.7.  Tissue transglutaminase antibodies were normal.  The patient reports being on a strictly gluten-free diet and is seeing her nutritionist weekly via telehealth services.  Despite this, the patient has had 2 outbreaks of dermatitis herpetiformis, both of which she relates to significant stress including the death of her father.  She is currently on a prednisone taper along with dapsone for the dermatitis herpetiformis and is followed by Dr. Mendez.  She is doing well from a GI standpoint denying abdominal pain, heartburn, dysphasia.  She is on Prilosec daily for her cough which she states is improved by the Prilosec.  She has 2 bowel movements a day which are mostly solid.  She denies melena or bright red blood per rectum.  The patient's weight is stable.  She has osteoporosis and is scheduled to see an endocrinologist within the next 2 weeks.  She is on vitamin D3 supplementation.  She has a history of colonic polyps.\par \par \par Note from 10/17/2019 - The patient has celiac disease, dermatitis herpetiformis, and osteoporosis.  The patient is status post EGD performed on August 15, 2019.  EGD revealed a 5 cm hiatal hernia with mild erosive gastritis and flat duodenal bulb mucosa.  Biopsies from the distal duodenum showed normal villi with mildly increased intraepithelial lymphocytes but biopsies from the duodenal bulb continue to show partial villous atrophy also with increased intraepithelial lymphocytes.  The patient has been seeing Siva Johnson for nutritional counseling and reports being on a 100% gluten-free diet.  She has had no further itching or rash from the dermatitis herpetiformis and her dapsone dosage was decreased from 200-1 50.  She denies abdominal pain, bloating, heartburn, dysphasia.  Bowel movements are normal without diarrhea, constipation, melena, bright red blood per rectum.  The patient has had a cough and throat clearing.  She saw an ENT doctor who diagnosed her with reflux and placed her on omeprazole 40 mg a day which she has been taking since Friday.\par \par \par Note from 7/8/2019 - The patient has celiac disease, dermatitis herpetiformis, and gastric ulcers We reviewed the evaluations done since the patient's last visit on May 20, 2019. Blood work from that day revealed elevated anti-deamidated gliadin antibodies both IgG and IgA as well as positive tissue transglutaminase IgG and weakly positive tissue transglutaminase IgA. Vitamin D level was decreased at 19.7. The patient had a bone density scan which revealed evidence of osteoporosis. She has since begun vitamin D 3 2000 units a day and has seen an endocrinologist who repeated a bone density scan with more benign findings. Further workup as being done. The patient has been seen by nutritionist and states that she is on a 100% gluten free diet. She is understanding the concepts of contact and cross contamination. She is being followed by Dr. Mendez for her dermatitis herpetiformis. She reports ongoing lesions. She also complains of oral ulcers which are painful. She denies abdominal pain, bloating, heartburn, dysphagia, nausea, vomiting. Bowel movements are normal. The patient had an H. pylori breath test which was negative.\par \par \par Note from 5/20/19 - The patient is a 58-year-old woman who developed a pruritic rash initially on her hands in March of 2018. 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 The patient was treated with prednisone and hydroxychloroquine. Despite this the rash continued to spread to other parts of her body including her face. She switched to another dermatologist Dr. 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 Biopsies were taken with positive direct immunofluorescence consistent with this diagnosis. The patient has been on dapsone since November but the dose has been increased in March. At that time, the patient was advised to be evaluated for celiac disease. She had no symptoms referrable to the GI tract.\par \par The patient had blood work done in March that showed an elevated tissue transglutaminase IgA of 16.1 and a positive anti-endomysial antibody. She subsequently underwent EGD and colonoscopy on April 4, 2019 by another physician. EGD was significant for a 5 cm hiatal hernia with 3 prepyloric antral ulcers and mild scalloping noted. Biopsies from the duodenum showed mild to moderate villous blunting with minimally increased intraepithelial lymphocytes along with a finding of intestinal metaplasia and H. pylori in the stomach. Colonoscopy was significant only for diverticulosis. She does have a history of colonic polyps. Celiac genetic testing was done showing positive DQ2.  Blood work also showed a mild anemia with a hemoglobin and hematocrit of 10.7 and 34.5 respectively.\par \par The patient has been treated for H. pylori with 2 weeks of amoxicillin, clarithromycin, metronidazole, and pantoprazole.\par \par The patient denies abdominal pain, bloating, heartburn, or dysphagia. She is to solid bowel movements a day without melena or bright red blood per rectum. She has been gaining weight and is gained 10 pounds over the past month. She recently started on a gluten-free diet although she admits that she is cheated. She did develop a new hand lesion yesterday.

## 2020-06-24 NOTE — CONSULT LETTER
[FreeTextEntry1] : Dear Dr. Preethi Nash and Dr. Bianka Mendez,\par \par I had the pleasure of seeing your patient ELLIOT CRUZ in the office today.  My office note is attached. PLEASE READ THE "ASSESSMENT" SECTION OF THE NOTE TO SEE MY IMPRESSION AND PLAN.\par \par Thank you very much for allowing me to participate in the care of your patient.\par \par Sincerely,\par \par Bakari Johnson M.D., FACG, FACP\par Director, Celiac Program at [facility]  of Medicine\par [employer] [facility] Practice Director,\[facility] - Gastroenterology/Internal Medicine at [place]\[facility] [address]\[place] [address]\par Tel: [phone]\par Email: [email or web address]\par \par \[facility] The attached note has been created using a voice recognition system (Dragon).  There may be some misspellings and typos.  Please call my office if you have any issues or questions.

## 2020-06-24 NOTE — PHYSICAL EXAM
[General Appearance - Alert] : alert [General Appearance - In No Acute Distress] : in no acute distress [Neck Cervical Mass (___cm)] : no neck mass was observed [Neck Appearance] : the appearance of the neck was normal [Jugular Venous Distention Increased] : there was no jugular-venous distention [Thyroid Diffuse Enlargement] : the thyroid was not enlarged [Thyroid Nodule] : there were no palpable thyroid nodules [Auscultation Breath Sounds / Voice Sounds] : lungs were clear to auscultation bilaterally [Heart Rate And Rhythm] : heart rate was normal and rhythm regular [Heart Sounds] : normal S1 and S2 [Heart Sounds Gallop] : no gallops [Murmurs] : no murmurs [Edema] : there was no peripheral edema [Heart Sounds Pericardial Friction Rub] : no pericardial rub [Bowel Sounds] : normal bowel sounds [Abdomen Soft] : soft [Abdomen Tenderness] : non-tender [Abdomen Mass (___ Cm)] : no abdominal mass palpated [] : no hepato-splenomegaly [No Spinal Tenderness] : no spinal tenderness [No CVA Tenderness] : no ~M costovertebral angle tenderness [Oriented To Time, Place, And Person] : oriented to person, place, and time [Impaired Insight] : insight and judgment were intact [Affect] : the affect was normal [FreeTextEntry1] : obese

## 2020-06-24 NOTE — REASON FOR VISIT
[FreeTextEntry1] : Celiac disease, dermatitis herpetiformis, history of colonic polyps, osteoporosis, reflux

## 2020-06-30 LAB
25(OH)D3 SERPL-MCNC: 40.6 NG/ML
ALBUMIN SERPL ELPH-MCNC: 4.8 G/DL
ALP BLD-CCNC: 97 U/L
ALT SERPL-CCNC: 17 U/L
ANION GAP SERPL CALC-SCNC: 14 MMOL/L
AST SERPL-CCNC: 18 U/L
BASOPHILS # BLD AUTO: 0.03 K/UL
BASOPHILS NFR BLD AUTO: 0.2 %
BILIRUB SERPL-MCNC: 0.8 MG/DL
BUN SERPL-MCNC: 16 MG/DL
CALCIUM SERPL-MCNC: 10.4 MG/DL
CAROTENE SERPL-MCNC: 46 MCG/DL
CHLORIDE SERPL-SCNC: 101 MMOL/L
CO2 SERPL-SCNC: 26 MMOL/L
CREAT SERPL-MCNC: 0.83 MG/DL
ENDOMYSIUM IGA SER QL: POSITIVE
ENDOMYSIUM IGA TITR SER: ABNORMAL
EOSINOPHIL # BLD AUTO: 0 K/UL
EOSINOPHIL NFR BLD AUTO: 0 %
FERRITIN SERPL-MCNC: 60 NG/ML
FOLATE SERPL-MCNC: 18.5 NG/ML
GLIADIN IGA SER QL: 17.3 UNITS
GLIADIN IGG SER QL: 29.5 UNITS
GLIADIN PEPTIDE IGA SER-ACNC: NEGATIVE
GLIADIN PEPTIDE IGG SER-ACNC: ABNORMAL
GLUCOSE SERPL-MCNC: 121 MG/DL
HCT VFR BLD CALC: 36.9 %
HGB BLD-MCNC: 11.2 G/DL
IGA SER QL IEP: 172 MG/DL
IMM GRANULOCYTES NFR BLD AUTO: 1.1 %
IRON SATN MFR SERPL: 15 %
IRON SERPL-MCNC: 60 UG/DL
LYMPHOCYTES # BLD AUTO: 2.29 K/UL
LYMPHOCYTES NFR BLD AUTO: 16.4 %
MAGNESIUM SERPL-MCNC: 2.3 MG/DL
MAN DIFF?: NORMAL
MCHC RBC-ENTMCNC: 30.4 GM/DL
MCHC RBC-ENTMCNC: 31.9 PG
MCV RBC AUTO: 105.1 FL
MONOCYTES # BLD AUTO: 0.83 K/UL
MONOCYTES NFR BLD AUTO: 5.9 %
NEUTROPHILS # BLD AUTO: 10.67 K/UL
NEUTROPHILS NFR BLD AUTO: 76.4 %
PLATELET # BLD AUTO: 328 K/UL
POTASSIUM SERPL-SCNC: 4.5 MMOL/L
PROT SERPL-MCNC: 8.3 G/DL
RBC # BLD: 3.51 M/UL
RBC # FLD: 17.9 %
SODIUM SERPL-SCNC: 141 MMOL/L
TIBC SERPL-MCNC: 390 UG/DL
TSH SERPL-ACNC: 0.6 UIU/ML
TTG IGA SER IA-ACNC: 3.3 U/ML
TTG IGA SER-ACNC: NEGATIVE
TTG IGG SER IA-ACNC: 7.3 U/ML
TTG IGG SER IA-ACNC: ABNORMAL
UIBC SERPL-MCNC: 330 UG/DL
VIT A SERPL-MCNC: 44.2 UG/DL
VIT B12 SERPL-MCNC: 983 PG/ML
VIT B6 SERPL-MCNC: 17.1 UG/L
WBC # FLD AUTO: 13.97 K/UL
ZINC SERPL-MCNC: 78 UG/DL

## 2020-07-02 LAB — MENADIONE SERPL-MCNC: 0.42 NG/ML

## 2020-07-16 ENCOUNTER — APPOINTMENT (OUTPATIENT)
Dept: ENDOCRINOLOGY | Facility: CLINIC | Age: 60
End: 2020-07-16
Payer: COMMERCIAL

## 2020-07-16 VITALS
WEIGHT: 287 LBS | SYSTOLIC BLOOD PRESSURE: 110 MMHG | BODY MASS INDEX: 52.81 KG/M2 | OXYGEN SATURATION: 98 % | DIASTOLIC BLOOD PRESSURE: 80 MMHG | HEIGHT: 62 IN | HEART RATE: 89 BPM | TEMPERATURE: 97.7 F

## 2020-07-16 DIAGNOSIS — Z87.39 PERSONAL HISTORY OF OTHER DISEASES OF THE MUSCULOSKELETAL SYSTEM AND CONNECTIVE TISSUE: ICD-10-CM

## 2020-07-16 DIAGNOSIS — M85.80 OTHER SPECIFIED DISORDERS OF BONE DENSITY AND STRUCTURE, UNSPECIFIED SITE: ICD-10-CM

## 2020-07-16 DIAGNOSIS — E55.9 VITAMIN D DEFICIENCY, UNSPECIFIED: ICD-10-CM

## 2020-07-16 PROCEDURE — ZZZZZ: CPT

## 2020-07-16 PROCEDURE — 77080 DXA BONE DENSITY AXIAL: CPT

## 2020-07-16 PROCEDURE — 99205 OFFICE O/P NEW HI 60 MIN: CPT | Mod: 25

## 2020-07-16 RX ORDER — HYDROCORTISONE 25 MG/G
2.5 OINTMENT TOPICAL
Qty: 1 | Refills: 0 | Status: DISCONTINUED | COMMUNITY
Start: 2019-05-08 | End: 2020-07-16

## 2020-07-16 RX ORDER — TACROLIMUS 1 MG/G
0.1 OINTMENT TOPICAL
Qty: 1 | Refills: 0 | Status: DISCONTINUED | COMMUNITY
Start: 2019-08-06 | End: 2020-07-16

## 2020-07-16 RX ORDER — TRIAMCINOLONE ACETONIDE 1 MG/G
0.1 PASTE DENTAL 3 TIMES DAILY
Qty: 2 | Refills: 0 | Status: DISCONTINUED | COMMUNITY
Start: 2019-07-09 | End: 2020-07-16

## 2020-07-16 RX ORDER — FLUOCINONIDE 0.05 MG/G
0.05 OINTMENT TOPICAL
Qty: 1 | Refills: 0 | Status: DISCONTINUED | COMMUNITY
Start: 2018-09-11 | End: 2020-07-16

## 2020-07-16 RX ORDER — DICLOFENAC SODIUM 10 MG/G
1 GEL TOPICAL DAILY
Qty: 1 | Refills: 0 | Status: DISCONTINUED | COMMUNITY
Start: 2019-11-01 | End: 2020-07-16

## 2020-07-16 RX ORDER — DIPHENHYDRAMINE HYDROCHLORIDE AND LIDOCAINE HYDROCHLORIDE AND ALUMINUM HYDROXIDE AND MAGNESIUM HYDRO
KIT
Qty: 1 | Refills: 0 | Status: DISCONTINUED | COMMUNITY
Start: 2019-05-10 | End: 2020-07-16

## 2020-07-16 RX ORDER — OMEPRAZOLE MAGNESIUM 20 MG/1
20 TABLET, DELAYED RELEASE ORAL
Refills: 0 | Status: DISCONTINUED | COMMUNITY
End: 2020-07-16

## 2020-07-16 RX ORDER — PREDNISONE 10 MG/1
10 TABLET ORAL
Qty: 40 | Refills: 0 | Status: DISCONTINUED | COMMUNITY
Start: 2020-06-16 | End: 2020-07-16

## 2020-07-16 NOTE — REASON FOR VISIT
[Consultation] : a consultation visit [Osteoporosis] : osteoporosis [FreeTextEntry2] : Bakari Johnson MD

## 2020-07-16 NOTE — HISTORY OF PRESENT ILLNESS
[Family History of Osteoporosis] : family history of osteoporosis [FreeTextEntry1] : 59-year-old female referred for management of osteoporosis.  Patient has a somewhat complex medical history of dermatitis type of dermatitis herpetiformis diagnosed approximately 3 years ago.  Further testing suggested this was associated with asymptomatic celiac disease.  The patient has been placed on a gluten-free diet.  She continues to have skin lesions and has been on occasional prednisone.  She recently had 1 month of prednisone stopping several weeks ago.  Bone density tests have been abnormal over several years.  Her last bone density test June 2019 showed a questionable low bone density in the left femoral neck only although normal values in the left total hip , right t femoral neck and right femoral neck.  The spine was not performed.  There was no obvious artifact as the cause of this discrepancy.  The patient has not suffered osteoporosis related fractures.  She has not been treated for osteoporosis.\par She has had mildly low vitamin D values in the 19-20 range.  She began vitamin D supplements for this currently 3000 units/day.\par The patient is requesting an adrenal evaluation as she has been reading that stress can elevate the pituitary adrenal axis.  She feels under stress due to the recent death of family members as well as routine stress of COVID.  she has no particular symptoms of Cushing's syndrome. [Disordered Eating] : no history of disordered eating [Amenorrhea] : no amenorrhea [Hyperparathyroidism] : no history of hyperparathyroidism [Taking Steroids] : no history of taking steroids [Family History of Hip Fracture] : no family history of hip fracture [Diabetes] : no history of diabetes [de-identified] : 8=h/o low Vit D 20

## 2020-07-16 NOTE — ASSESSMENT
[FreeTextEntry1] : Complicated 59-year-old female presents with reported low hip bone density.  Patient has a complex history of dermatitis herpetiformis which has been associated with asymptomatic celiac disease in her case.  She had no significant pre-existing malabsorption.  Vitamin D level has been mildly low.  The patient has had no fractures.\par Previous left femoral neck appeared to be isolated low value although there was a high index of suspicion that this was a unexplained artifact.\par Repeat bone density 2020 shows fairly average values in the hips bilaterally both femoral neck and total hip regions.  The patient does not appear to be at high risk for fracture at this time.  I recommended against medical therapy for osteoporosis.\par She does have mildly low vitamin D and I agree with vitamin D 3000 units/day.\par The patient has requested an evaluation of the adrenal function.  Check late-night salivary cortisol to help exclude Cushing's syndrome.  Patient advised that there is no syndrome of "adrenal fatigue" as suggested by the Internet.  The patient has no suggestion at all of adrenal insufficiency.

## 2020-07-16 NOTE — PROCEDURE
[FreeTextEntry1] : Bone mineral density July 16, 2020\par Compared to 2019 indication prior test suggested rapid bone loss hip\par Left femoral neck -1.4 osteopenia\par Left total hip -1.5 osteopenia\par Right femoral neck -2.1 osteopenia\par Right total hip -0.8 normal

## 2020-07-16 NOTE — CONSULT LETTER
[Consult Letter:] : I had the pleasure of evaluating your patient, [unfilled]. [Dear  ___] : Dear  [unfilled], [Consult Closing:] : Thank you very much for allowing me to participate in the care of this patient.  If you have any questions, please do not hesitate to contact me. [Please see my note below.] : Please see my note below. [Sincerely,] : Sincerely, [FreeTextEntry2] : Bakari Johnson MD\par [address] [address] [DrSebastian  ___] : Dr. SZYMANSKI [DrSebastian ___] : Dr. SZYMANSKI

## 2020-07-16 NOTE — PHYSICAL EXAM
[Well Nourished] : well nourished [Alert] : alert [No Acute Distress] : no acute distress [Well Developed] : well developed [Normal Sclera/Conjunctiva] : normal sclera/conjunctiva [EOMI] : extra ocular movement intact [Thyroid Not Enlarged] : the thyroid was not enlarged [No Proptosis] : no proptosis [Normal Oropharynx] : the oropharynx was normal [No Thyroid Nodules] : no palpable thyroid nodules [No Respiratory Distress] : no respiratory distress [Normal S1, S2] : normal S1 and S2 [No Accessory Muscle Use] : no accessory muscle use [Clear to Auscultation] : lungs were clear to auscultation bilaterally [Normal Rate] : heart rate was normal [No Edema] : no peripheral edema [Pedal Pulses Normal] : the pedal pulses are present [Regular Rhythm] : with a regular rhythm [Normal Bowel Sounds] : normal bowel sounds [Not Tender] : non-tender [Not Distended] : not distended [Soft] : abdomen soft [Normal Posterior Cervical Nodes] : no posterior cervical lymphadenopathy [Normal Anterior Cervical Nodes] : no anterior cervical lymphadenopathy [Spine Straight] : spine straight [No Spinal Tenderness] : no spinal tenderness [Normal Gait] : normal gait [Normal Strength/Tone] : muscle strength and tone were normal [No Stigmata of Cushings Syndrome] : no stigmata of Cushings Syndrome [Acanthosis Nigricans] : no acanthosis nigricans [Normal Reflexes] : deep tendon reflexes were 2+ and symmetric [Oriented x3] : oriented to person, place, and time [de-identified] : obese [No Tremors] : no tremors [de-identified] : mult excoriated papules  yogi arms

## 2020-07-17 ENCOUNTER — APPOINTMENT (OUTPATIENT)
Dept: DERMATOLOGY | Facility: CLINIC | Age: 60
End: 2020-07-17
Payer: COMMERCIAL

## 2020-07-17 VITALS — BODY MASS INDEX: 52.81 KG/M2 | WEIGHT: 287 LBS | HEIGHT: 62 IN

## 2020-07-17 DIAGNOSIS — Z79.899 OTHER LONG TERM (CURRENT) DRUG THERAPY: ICD-10-CM

## 2020-07-17 PROCEDURE — 99214 OFFICE O/P EST MOD 30 MIN: CPT

## 2020-07-28 ENCOUNTER — TRANSCRIPTION ENCOUNTER (OUTPATIENT)
Age: 60
End: 2020-07-28

## 2020-08-03 ENCOUNTER — TRANSCRIPTION ENCOUNTER (OUTPATIENT)
Age: 60
End: 2020-08-03

## 2020-08-18 ENCOUNTER — APPOINTMENT (OUTPATIENT)
Dept: FAMILY MEDICINE | Facility: CLINIC | Age: 60
End: 2020-08-18
Payer: COMMERCIAL

## 2020-08-18 ENCOUNTER — NON-APPOINTMENT (OUTPATIENT)
Age: 60
End: 2020-08-18

## 2020-08-18 VITALS
WEIGHT: 286 LBS | SYSTOLIC BLOOD PRESSURE: 126 MMHG | HEART RATE: 89 BPM | BODY MASS INDEX: 52.63 KG/M2 | HEIGHT: 62 IN | DIASTOLIC BLOOD PRESSURE: 70 MMHG

## 2020-08-18 DIAGNOSIS — Z11.9 ENCOUNTER FOR SCREENING FOR INFECTIOUS AND PARASITIC DISEASES, UNSPECIFIED: ICD-10-CM

## 2020-08-18 DIAGNOSIS — Z00.00 ENCOUNTER FOR GENERAL ADULT MEDICAL EXAMINATION W/OUT ABNORMAL FINDINGS: ICD-10-CM

## 2020-08-18 PROCEDURE — 93000 ELECTROCARDIOGRAM COMPLETE: CPT

## 2020-08-18 PROCEDURE — 99396 PREV VISIT EST AGE 40-64: CPT | Mod: 25

## 2020-08-18 PROCEDURE — 36415 COLL VENOUS BLD VENIPUNCTURE: CPT

## 2020-08-21 NOTE — PHYSICAL EXAM
[No Acute Distress] : no acute distress [Well Nourished] : well nourished [Normal Sclera/Conjunctiva] : normal sclera/conjunctiva [Well-Appearing] : well-appearing [PERRL] : pupils equal round and reactive to light [Normal Oropharynx] : the oropharynx was normal [Normal Outer Ear/Nose] : the outer ears and nose were normal in appearance [Normal TMs] : both tympanic membranes were normal [No Lymphadenopathy] : no lymphadenopathy [No Respiratory Distress] : no respiratory distress  [Supple] : supple [Normal Rate] : normal rate  [Clear to Auscultation] : lungs were clear to auscultation bilaterally [No Accessory Muscle Use] : no accessory muscle use [Soft] : abdomen soft [Regular Rhythm] : with a regular rhythm [Normal S1, S2] : normal S1 and S2 [No Joint Swelling] : no joint swelling [Grossly Normal Strength/Tone] : grossly normal strength/tone [Normal Bowel Sounds] : normal bowel sounds [Non Tender] : non-tender [Coordination Grossly Intact] : coordination grossly intact [No Focal Deficits] : no focal deficits [Memory Grossly Normal] : memory grossly normal [Speech Grossly Normal] : speech grossly normal [Deep Tendon Reflexes (DTR)] : deep tendon reflexes were 2+ and symmetric [Normal Mood] : the mood was normal [de-identified] : EXCORIATED PAPULES AND NODULES ON THE FOREARMS, BACK

## 2020-08-21 NOTE — HISTORY OF PRESENT ILLNESS
[FreeTextEntry1] : CPE [de-identified] : MS. CRUZ IS A PLEASANT 60 YO PRESENTING FOR YEARLY CPE.  CHRONIC HISTORY OF CELIAC, DERMATITIS HERPETIFORMIS UNDER THE CARE OF DERMATOLOGY, VITAMIN D INSUFFICIENCY, OSTEOPENIA, BILATERAL KNEE AND RIGHT HIP PAIN CHRONICALLY LIMITING MOBILITY, EDITH, OBESITY.  BEING TREATED WITH DAPSONE.  RECENT RECURRENCE ON SKIN - MOSTLY FOREARMS - IMPROVING.  CAN BE PAINFUL WHEN PRESENT.\par \par \par ORTHOPEDICS: DR. GONZALEZ\par DERMATOLOGY: DR. FUNG\par GI: DR. STOVER / RADHA\par ENDOCRINOLOGY: DR. ABBASI\par GYN: DR. MORA\par

## 2020-08-21 NOTE — REVIEW OF SYSTEMS
[Joint Pain] : joint pain [Negative] : Heme/Lymph [Muscle Weakness] : no muscle weakness [Depression] : no depression [Suicidal] : not suicidal [de-identified] : SEE HPI

## 2020-08-21 NOTE — HEALTH RISK ASSESSMENT
[Very Good] : ~his/her~  mood as very good [Monthly or less (1 pt)] : Monthly or less (1 point) [Yes] : Yes [Never (0 pts)] : Never (0 points) [1 or 2 (0 pts)] : 1 or 2 (0 points) [No] : In the past 12 months have you used drugs other than those required for medical reasons? No [No falls in past year] : Patient reported no falls in the past year [HIV Test offered] : HIV Test offered [Hepatitis C test offered] : Hepatitis C test offered [Alone] : lives alone [None] : None [College] : College [Employed] : employed [Single] : single [Feels Safe at Home] : Feels safe at home [Fully functional (bathing, dressing, toileting, transferring, walking, feeding)] : Fully functional (bathing, dressing, toileting, transferring, walking, feeding) [Fully functional (using the telephone, shopping, preparing meals, housekeeping, doing laundry, using] : Fully functional and needs no help or supervision to perform IADLs (using the telephone, shopping, preparing meals, housekeeping, doing laundry, using transportation, managing medications and managing finances) [Reports normal functional visual acuity (ie: able to read med bottle)] : Reports normal functional visual acuity [Reports changes in dental health] : Reports changes in dental health [Smoke Detector] : smoke detector [Carbon Monoxide Detector] : carbon monoxide detector [Seat Belt] :  uses seat belt [Sunscreen] : uses sunscreen [With Patient/Caregiver] : With Patient/Caregiver [Designated Healthcare Proxy] : Designated healthcare proxy [Name: ___] : Health Care Proxy's Name: [unfilled]  [Relationship: ___] : Relationship: [unfilled] [] : No [Audit-CScore] : 1 [de-identified] : SOME WALKING [de-identified] : MEETING WITH NUTRITIONIST WEEKLY, EATING HEALTHY, CELIAC DIET [Language] : denies difficulty with language [Change in mental status noted] : No change in mental status noted [Learning/Retaining New Information] : denies difficulty learning/retaining new information [Reports changes in vision] : Reports no changes in vision [Handling Complex Tasks] : denies difficulty handling complex tasks [Reports changes in hearing] : Reports no changes in hearing [MammogramComments] : [facility] [MammogramDate] : 07/20 [PapSmearDate] : 08/20 [BoneDensityDate] : 07/20 [ColonoscopyDate] : 04/19 [ColonoscopyComments] : 0 [de-identified] : SEES DENTIST ROUTINELY [de-identified] : GLASSES FOR DISTANCE AND READING [AdvancecareDate] : 08/20

## 2020-08-21 NOTE — PLAN
[FreeTextEntry1] : VISION SCREENING\par HEALTHY DIET AND LIFESTYLE MODIFICATIONS; HEALTHY WEIGHT LOSS \par EKG: NSR AT 69 BPM, NO ACUTE ST-T WAVE CHANGES\par CHECK LAB WORK\par FLU VACCINE IN THE FALL\par CONSIDER SHINGRIX\par NEED MAMMOGRAM RESULTS\par DERMATOLOGY CONSULTANT NOTE APPRECIATED\par FOLLOW-UP ALL SPECIALISTS AS DIRECTED \par CALL WITH ANY QUESTIONS, CONCERNS OR CHANGES \par

## 2020-08-25 LAB
APPEARANCE: CLEAR
BILIRUBIN URINE: NEGATIVE
BLOOD URINE: NEGATIVE
CHOLEST SERPL-MCNC: 190 MG/DL
CHOLEST/HDLC SERPL: 2.8 RATIO
COLOR: NORMAL
ESTIMATED AVERAGE GLUCOSE: <68 MG/DL
GLUCOSE QUALITATIVE U: NEGATIVE
HBA1C MFR BLD HPLC: <4 %
HDLC SERPL-MCNC: 68 MG/DL
KETONES URINE: NEGATIVE
LDLC SERPL CALC-MCNC: 101 MG/DL
LEUKOCYTE ESTERASE URINE: NEGATIVE
NITRITE URINE: NEGATIVE
PH URINE: 6.5
PROTEIN URINE: NEGATIVE
SARS-COV-2 IGG SERPL IA-ACNC: 0.08 INDEX
SARS-COV-2 IGG SERPL QL IA: NEGATIVE
SPECIFIC GRAVITY URINE: 1.01
TRIGL SERPL-MCNC: 100 MG/DL
UROBILINOGEN URINE: NORMAL

## 2020-09-03 LAB — CORTIS SAL-MCNC: NORMAL

## 2020-09-21 ENCOUNTER — TRANSCRIPTION ENCOUNTER (OUTPATIENT)
Age: 60
End: 2020-09-21

## 2020-09-21 RX ORDER — DAPSONE 100 MG/1
100 TABLET ORAL TWICE DAILY
Qty: 180 | Refills: 0 | Status: ACTIVE | COMMUNITY
Start: 2018-11-16 | End: 1900-01-01

## 2020-09-24 ENCOUNTER — TRANSCRIPTION ENCOUNTER (OUTPATIENT)
Age: 60
End: 2020-09-24

## 2020-10-02 LAB — HEMOCCULT STL QL IA: NEGATIVE

## 2020-11-17 ENCOUNTER — APPOINTMENT (OUTPATIENT)
Dept: DERMATOLOGY | Facility: CLINIC | Age: 60
End: 2020-11-17
Payer: COMMERCIAL

## 2020-11-17 VITALS — WEIGHT: 283 LBS | BODY MASS INDEX: 52.08 KG/M2 | HEIGHT: 62 IN

## 2020-11-17 DIAGNOSIS — L30.9 DERMATITIS, UNSPECIFIED: ICD-10-CM

## 2020-11-17 PROCEDURE — 99214 OFFICE O/P EST MOD 30 MIN: CPT

## 2020-11-23 ENCOUNTER — APPOINTMENT (OUTPATIENT)
Dept: DERMATOLOGY | Facility: CLINIC | Age: 60
End: 2020-11-23
Payer: COMMERCIAL

## 2020-11-23 PROCEDURE — 96910 PHOTCHMTX TAR&UVB/PTRLTM&UVB: CPT

## 2020-11-23 PROCEDURE — 99214 OFFICE O/P EST MOD 30 MIN: CPT | Mod: 25

## 2020-11-25 ENCOUNTER — APPOINTMENT (OUTPATIENT)
Dept: DERMATOLOGY | Facility: CLINIC | Age: 60
End: 2020-11-25
Payer: COMMERCIAL

## 2020-11-25 PROCEDURE — 96910 PHOTCHMTX TAR&UVB/PTRLTM&UVB: CPT

## 2020-11-27 ENCOUNTER — APPOINTMENT (OUTPATIENT)
Dept: DERMATOLOGY | Facility: CLINIC | Age: 60
End: 2020-11-27
Payer: COMMERCIAL

## 2020-11-27 PROCEDURE — 96910 PHOTCHMTX TAR&UVB/PTRLTM&UVB: CPT

## 2020-11-30 ENCOUNTER — APPOINTMENT (OUTPATIENT)
Dept: DERMATOLOGY | Facility: CLINIC | Age: 60
End: 2020-11-30

## 2020-11-30 ENCOUNTER — APPOINTMENT (OUTPATIENT)
Dept: DERMATOLOGY | Facility: CLINIC | Age: 60
End: 2020-11-30
Payer: COMMERCIAL

## 2020-11-30 PROCEDURE — 99072 ADDL SUPL MATRL&STAF TM PHE: CPT

## 2020-11-30 PROCEDURE — 96910 PHOTCHMTX TAR&UVB/PTRLTM&UVB: CPT

## 2020-12-02 ENCOUNTER — APPOINTMENT (OUTPATIENT)
Dept: DERMATOLOGY | Facility: CLINIC | Age: 60
End: 2020-12-02
Payer: COMMERCIAL

## 2020-12-02 PROCEDURE — 96910 PHOTCHMTX TAR&UVB/PTRLTM&UVB: CPT

## 2020-12-02 PROCEDURE — 99072 ADDL SUPL MATRL&STAF TM PHE: CPT

## 2020-12-04 ENCOUNTER — APPOINTMENT (OUTPATIENT)
Dept: DERMATOLOGY | Facility: CLINIC | Age: 60
End: 2020-12-04
Payer: COMMERCIAL

## 2020-12-04 PROCEDURE — 99072 ADDL SUPL MATRL&STAF TM PHE: CPT

## 2020-12-04 PROCEDURE — 96910 PHOTCHMTX TAR&UVB/PTRLTM&UVB: CPT

## 2020-12-07 ENCOUNTER — APPOINTMENT (OUTPATIENT)
Dept: DERMATOLOGY | Facility: CLINIC | Age: 60
End: 2020-12-07

## 2020-12-08 ENCOUNTER — APPOINTMENT (OUTPATIENT)
Dept: DERMATOLOGY | Facility: CLINIC | Age: 60
End: 2020-12-08
Payer: COMMERCIAL

## 2020-12-08 ENCOUNTER — NON-APPOINTMENT (OUTPATIENT)
Age: 60
End: 2020-12-08

## 2020-12-08 PROCEDURE — 96910 PHOTCHMTX TAR&UVB/PTRLTM&UVB: CPT

## 2020-12-08 PROCEDURE — 99072 ADDL SUPL MATRL&STAF TM PHE: CPT

## 2020-12-10 ENCOUNTER — APPOINTMENT (OUTPATIENT)
Dept: DERMATOLOGY | Facility: CLINIC | Age: 60
End: 2020-12-10
Payer: COMMERCIAL

## 2020-12-10 PROCEDURE — 96910 PHOTCHMTX TAR&UVB/PTRLTM&UVB: CPT

## 2020-12-10 PROCEDURE — 99072 ADDL SUPL MATRL&STAF TM PHE: CPT

## 2020-12-12 ENCOUNTER — APPOINTMENT (OUTPATIENT)
Dept: DERMATOLOGY | Facility: CLINIC | Age: 60
End: 2020-12-12
Payer: COMMERCIAL

## 2020-12-12 PROCEDURE — 96910 PHOTCHMTX TAR&UVB/PTRLTM&UVB: CPT

## 2020-12-12 PROCEDURE — 99072 ADDL SUPL MATRL&STAF TM PHE: CPT

## 2020-12-14 ENCOUNTER — APPOINTMENT (OUTPATIENT)
Dept: DERMATOLOGY | Facility: CLINIC | Age: 60
End: 2020-12-14
Payer: COMMERCIAL

## 2020-12-14 PROCEDURE — 99072 ADDL SUPL MATRL&STAF TM PHE: CPT

## 2020-12-14 PROCEDURE — 96910 PHOTCHMTX TAR&UVB/PTRLTM&UVB: CPT

## 2020-12-16 ENCOUNTER — APPOINTMENT (OUTPATIENT)
Dept: DERMATOLOGY | Facility: CLINIC | Age: 60
End: 2020-12-16

## 2020-12-19 ENCOUNTER — APPOINTMENT (OUTPATIENT)
Dept: DERMATOLOGY | Facility: CLINIC | Age: 60
End: 2020-12-19
Payer: COMMERCIAL

## 2020-12-19 PROCEDURE — 99072 ADDL SUPL MATRL&STAF TM PHE: CPT

## 2020-12-19 PROCEDURE — 96910 PHOTCHMTX TAR&UVB/PTRLTM&UVB: CPT

## 2020-12-21 ENCOUNTER — APPOINTMENT (OUTPATIENT)
Dept: DERMATOLOGY | Facility: CLINIC | Age: 60
End: 2020-12-21
Payer: COMMERCIAL

## 2020-12-21 PROCEDURE — 96910 PHOTCHMTX TAR&UVB/PTRLTM&UVB: CPT

## 2020-12-21 PROCEDURE — 99072 ADDL SUPL MATRL&STAF TM PHE: CPT

## 2020-12-23 ENCOUNTER — APPOINTMENT (OUTPATIENT)
Dept: DERMATOLOGY | Facility: CLINIC | Age: 60
End: 2020-12-23
Payer: COMMERCIAL

## 2020-12-23 PROCEDURE — 96910 PHOTCHMTX TAR&UVB/PTRLTM&UVB: CPT

## 2020-12-23 PROCEDURE — 99072 ADDL SUPL MATRL&STAF TM PHE: CPT

## 2020-12-26 ENCOUNTER — APPOINTMENT (OUTPATIENT)
Dept: DERMATOLOGY | Facility: CLINIC | Age: 60
End: 2020-12-26
Payer: COMMERCIAL

## 2020-12-26 PROCEDURE — 96910 PHOTCHMTX TAR&UVB/PTRLTM&UVB: CPT

## 2020-12-26 PROCEDURE — 99072 ADDL SUPL MATRL&STAF TM PHE: CPT

## 2020-12-28 ENCOUNTER — APPOINTMENT (OUTPATIENT)
Dept: DERMATOLOGY | Facility: CLINIC | Age: 60
End: 2020-12-28
Payer: COMMERCIAL

## 2020-12-28 PROCEDURE — 99072 ADDL SUPL MATRL&STAF TM PHE: CPT

## 2020-12-28 PROCEDURE — 96910 PHOTCHMTX TAR&UVB/PTRLTM&UVB: CPT

## 2020-12-30 ENCOUNTER — APPOINTMENT (OUTPATIENT)
Dept: DERMATOLOGY | Facility: CLINIC | Age: 60
End: 2020-12-30
Payer: COMMERCIAL

## 2020-12-30 PROCEDURE — 96910 PHOTCHMTX TAR&UVB/PTRLTM&UVB: CPT

## 2020-12-30 PROCEDURE — 99072 ADDL SUPL MATRL&STAF TM PHE: CPT

## 2021-01-02 ENCOUNTER — APPOINTMENT (OUTPATIENT)
Dept: DERMATOLOGY | Facility: CLINIC | Age: 61
End: 2021-01-02
Payer: COMMERCIAL

## 2021-01-02 PROCEDURE — 99072 ADDL SUPL MATRL&STAF TM PHE: CPT

## 2021-01-02 PROCEDURE — 96910 PHOTCHMTX TAR&UVB/PTRLTM&UVB: CPT

## 2021-01-04 ENCOUNTER — APPOINTMENT (OUTPATIENT)
Dept: DERMATOLOGY | Facility: CLINIC | Age: 61
End: 2021-01-04
Payer: COMMERCIAL

## 2021-01-04 PROCEDURE — 99212 OFFICE O/P EST SF 10 MIN: CPT | Mod: 25

## 2021-01-04 PROCEDURE — 96910 PHOTCHMTX TAR&UVB/PTRLTM&UVB: CPT

## 2021-01-04 PROCEDURE — 99072 ADDL SUPL MATRL&STAF TM PHE: CPT

## 2021-01-06 ENCOUNTER — APPOINTMENT (OUTPATIENT)
Dept: DERMATOLOGY | Facility: CLINIC | Age: 61
End: 2021-01-06
Payer: COMMERCIAL

## 2021-01-06 PROCEDURE — 99072 ADDL SUPL MATRL&STAF TM PHE: CPT

## 2021-01-06 PROCEDURE — 96910 PHOTCHMTX TAR&UVB/PTRLTM&UVB: CPT

## 2021-01-08 ENCOUNTER — APPOINTMENT (OUTPATIENT)
Dept: DERMATOLOGY | Facility: CLINIC | Age: 61
End: 2021-01-08
Payer: COMMERCIAL

## 2021-01-08 PROCEDURE — 99072 ADDL SUPL MATRL&STAF TM PHE: CPT

## 2021-01-08 PROCEDURE — 96910 PHOTCHMTX TAR&UVB/PTRLTM&UVB: CPT

## 2021-01-11 ENCOUNTER — APPOINTMENT (OUTPATIENT)
Dept: DERMATOLOGY | Facility: CLINIC | Age: 61
End: 2021-01-11
Payer: COMMERCIAL

## 2021-01-11 PROCEDURE — 99072 ADDL SUPL MATRL&STAF TM PHE: CPT

## 2021-01-11 PROCEDURE — 96910 PHOTCHMTX TAR&UVB/PTRLTM&UVB: CPT

## 2021-01-13 ENCOUNTER — APPOINTMENT (OUTPATIENT)
Dept: DERMATOLOGY | Facility: CLINIC | Age: 61
End: 2021-01-13
Payer: COMMERCIAL

## 2021-01-13 PROCEDURE — 99072 ADDL SUPL MATRL&STAF TM PHE: CPT

## 2021-01-13 PROCEDURE — 96910 PHOTCHMTX TAR&UVB/PTRLTM&UVB: CPT

## 2021-01-15 ENCOUNTER — APPOINTMENT (OUTPATIENT)
Dept: DERMATOLOGY | Facility: CLINIC | Age: 61
End: 2021-01-15
Payer: COMMERCIAL

## 2021-01-15 PROCEDURE — 96910 PHOTCHMTX TAR&UVB/PTRLTM&UVB: CPT

## 2021-01-15 PROCEDURE — 99072 ADDL SUPL MATRL&STAF TM PHE: CPT

## 2021-01-18 ENCOUNTER — NON-APPOINTMENT (OUTPATIENT)
Age: 61
End: 2021-01-18

## 2021-01-20 ENCOUNTER — APPOINTMENT (OUTPATIENT)
Dept: DERMATOLOGY | Facility: CLINIC | Age: 61
End: 2021-01-20
Payer: COMMERCIAL

## 2021-01-20 ENCOUNTER — APPOINTMENT (OUTPATIENT)
Dept: GASTROENTEROLOGY | Facility: CLINIC | Age: 61
End: 2021-01-20
Payer: COMMERCIAL

## 2021-01-20 VITALS
DIASTOLIC BLOOD PRESSURE: 90 MMHG | HEART RATE: 95 BPM | HEIGHT: 62 IN | SYSTOLIC BLOOD PRESSURE: 150 MMHG | TEMPERATURE: 97.7 F | WEIGHT: 290 LBS | BODY MASS INDEX: 53.37 KG/M2 | OXYGEN SATURATION: 93 %

## 2021-01-20 DIAGNOSIS — Z01.818 ENCOUNTER FOR OTHER PREPROCEDURAL EXAMINATION: ICD-10-CM

## 2021-01-20 DIAGNOSIS — Z86.010 PERSONAL HISTORY OF COLONIC POLYPS: ICD-10-CM

## 2021-01-20 DIAGNOSIS — Z20.822 CONTACT WITH AND (SUSPECTED) EXPOSURE TO COVID-19: ICD-10-CM

## 2021-01-20 PROCEDURE — 99072 ADDL SUPL MATRL&STAF TM PHE: CPT

## 2021-01-20 PROCEDURE — 96910 PHOTCHMTX TAR&UVB/PTRLTM&UVB: CPT

## 2021-01-20 PROCEDURE — 99214 OFFICE O/P EST MOD 30 MIN: CPT | Mod: 25

## 2021-01-20 PROCEDURE — 36415 COLL VENOUS BLD VENIPUNCTURE: CPT

## 2021-01-20 RX ORDER — MULTIVITAMIN
TABLET ORAL
Refills: 0 | Status: ACTIVE | COMMUNITY

## 2021-01-21 NOTE — REASON FOR VISIT
[FreeTextEntry1] : Celiac disease, dermatitis herpetiformis, hiatal hernia, history of colonic polyps

## 2021-01-21 NOTE — ASSESSMENT
[FreeTextEntry1] : Patient with celiac disease, dermatitis herpetiformis, 5 cm hiatal hernia, and a history of colonic polyps.  She has had frequent throat clearing but otherwise feels well.  She is on a 100% gluten-free diet.\par \par Bloodwork was sent for CBC, chem-pack, TSH, celiac markers, iron studies, B12, folate, vitamin A, vitamin D, vitamin K, magnesium, carotene, vitamin B6, zinc.\par \par An EGD has been scheduled. The risks, benefits, alternatives, and limitations of the procedure were explained.  The patient will require anesthesia evaluation prior to the procedure given her BMI of 53.04.\par \par Patient is due for colonoscopy in April 2022.\par \par \par Plan from 6/23/2020 - Patient with celiac disease and dermatitis herpetiformis.  She reports being on a strictly gluten-free diet but has had 2 outbreaks of dermatitis herpetiformis and is currently on a prednisone taper and dapsone.  She has osteoporosis and is to see an endocrinologist.  She has a history of colonic polyps.\par \par I had a long discussion with the patient regarding her celiac disease.  She was advised to continue the gluten-free diet and to follow-up with her nutritionist.\par \par Bloodwork was sent for CBC, chem-pack, TSH, celiac markers, iron studies, B12, folate, vitamin A, vitamin D, vitamin K, magnesium, carotene, vitamin B6, zinc.\par \par I advised the patient that we would hope to see normalization of the celiac serologies at this point.  We will need to repeat an endoscopy in the near future to ultimately assess response as she had ongoing partial villous atrophy on her last endoscopy.\par \par Patient will return to see me in 3 months.\par \par Patient is due for colonoscopy in April 2022.\par \par \par Plan from 10/17/2019 - Patient with celiac disease and dermatitis herpetiformis.  Her endoscopy still shows partial villous atrophy but she has now been on a much more strict gluten-free diet.  She has osteoporosis.  An ENT doctor recently diagnosed her with reflux as the cause of her cough and throat clearing and started her on omeprazole 40 mg a day.\par \par Patient will continue a strict 100% gluten-free diet.\par \par Bloodwork was sent for CBC, chem-pack, TSH, celiac markers, iron studies, B12, folate, vitamin D, and magnesium.\par \par Patient was advised to see an endocrinologist regarding treatment of her osteoporosis.\par \par Patient was advised to take vitamin D3 2000 international units a day.\par \par Patient will continue omeprazole 40 mg a day and we will observe for improvement of her symptoms of cough and throat clearing.  She does have a large hiatal hernia.\par \par Patient will return to see me in 3 months.\par \par \par Plan from 7/8/2019 - Patient with celiac disease and dermatitis herpetiformis. She is just recently started on a strict 100% gluten free diet but is now having the assistance of a skilled nutritionist and his understanding the diet better. She has a history of gastric ulcers with H. pylori which has been proven to be eradicated. She has vitamin D deficiency but is started on vitamin D 3 treatment. There was evidence of osteoporosis on bone density scan and the patient is now being followed by an endocrinologist. The patient is followed by Dr. Mendez for her dermatitis herpetiformis.\par \par I had a long discussion with the patient regarding her disease. She understands the need to be strictly gluten free. I also explained to her that resolution of symptoms may take some time.\par \par The patient was advised to followup with all of the practitioners discussed above.\par \par Patient will return to see me in September. At that time, we will schedule an EGD to follow up the gastric ulcers and repeat blood work.\par \par \par Plan from 5/20/19 - Patient with dermatitis herpetiformis with consistent skin biopsies. Because this disease is strongly associated with celiac disease, the patient underwent evaluation for celiac disease. In the process, the patient was found to have 3 prepyloric ulcers with the presence of H. pylori. She is now being treated for H. pylori.\par \par I had a very long conversation with the patient regarding celiac disease, how it was diagnosed, there was manifestations and potential complications, and its treatment. I explained that the diagnosis of celiac disease is made by a combination of multiple studies. Serologies revealed an elevated tissue transglutaminase IgA consistent with celiac disease. Duodenal biopsies showed mild to moderate villous blunting with minimally increased intraepithelial lymphocytes. On his biopsy findings are nonspecific for celiac disease, they are consistent. The patient has positive genetic testing for celiac disease as well. I explained to her that, even with the diagnosis of dermatitis herpetiformis alone but especially in concert with celiac disease, she will need to be on a strict 100% gluten free diet free of contact or cross contamination with gluten for the rest of her life.\par \par Information was given to the patient regarding celiac disease and a gluten free diet. The patient was also given a list of local restaurants that are "celiac friendly".\par \par Bloodwork was sent for CBC, chem-pack, TSH, celiac markers, iron studies, B12, folate, vitamin D, and magnesium.\par \par Patient was sent for a bone density scan.\par \par The patient was given the name of a nutitionist, Siva Johnson, to see.\par \par The patient is currently off of her proton pump inhibitor for 2 weeks and will be going for H. pylori breath test in early June. She will have these results forwarded to me. She will then go back on pantoprazole daily for ulcer healing.\par \par The patient will need a colonoscopy in 3-5 years.\par \par Patient will return to see me in 2 months. She will require eventual EGD to assess for ulcer healing and to evaluate for resolution of the small bowel biopsy findings.

## 2021-01-21 NOTE — CONSULT LETTER
[FreeTextEntry1] : Dear Dr. Preethi Nash and Dr. Bianak Mendez,\par \par I had the pleasure of seeing your patient ELLIOT CRUZ in the office today.  My office note is attached. PLEASE READ THE "ASSESSMENT" SECTION OF THE NOTE TO SEE MY IMPRESSION AND PLAN.\par \par Thank you very much for allowing me to participate in the care of your patient.\par \par Sincerely,\par \par Bakari Johnson M.D., FACG, FACP\par Director, Celiac Program at [facility]  of Medicine\par [place] [employer] at [facility] Practice Director,\[facility] - Gastroenterology/Internal Medicine at [place]\[facility] [address]\[place] [address]\par Tel: [phone]\par Email: [email or web address]\par \par \[facility] The attached note has been created using a voice recognition system (Dragon).  There may be some misspellings and typos.  Please call my office if you have any issues or questions.

## 2021-01-21 NOTE — HISTORY OF PRESENT ILLNESS
[FreeTextEntry1] : The patient has celiac disease, dermatitis herpetiformis, 5 cm hiatal hernia, and history of colonic polyps.  When she was last seen in June 2020, she had weakly positive deaminase gliadin IgG, positive antiendomysial antibody, weakly positive tissue transglutaminase IgG, but normal tissue transglutaminase IgA.  She has never had symptoms of celiac disease but remains on a 100% gluten-free diet.  She is now receiving UVB phototherapy 3 times a week for her skin lesions which has helped significantly.  Her dose of dapsone has been decreased.  She notes that she hears gurgling in the left upper quadrant but she denies abdominal pain, bloating, heartburn, dysphagia.  She does note frequent throat clearing.  Bowel movements are normal with 2-3 solid bowel movements a day without melena or bright red blood per rectum.  The patient's weight is stable.  She has osteoporosis and is followed by endocrine for this.  The patient has not been admitted to the hospital in the past year and denies any cardiac issues.\par \par \par Note from 6/23/2020 - The patient has celiac disease and dermatitis herpetiformis.  Her last blood work done in October revealed celiac labs that were improving but not yet normal with a positive delaminated gliadin antibody IgG of 54.4 and a weakly positive IgA of 26.7.  Tissue transglutaminase antibodies were normal.  The patient reports being on a strictly gluten-free diet and is seeing her nutritionist weekly via telehealth services.  Despite this, the patient has had 2 outbreaks of dermatitis herpetiformis, both of which she relates to significant stress including the death of her father.  She is currently on a prednisone taper along with dapsone for the dermatitis herpetiformis and is followed by Dr. Mendez.  She is doing well from a GI standpoint denying abdominal pain, heartburn, dysphasia.  She is on Prilosec daily for her cough which she states is improved by the Prilosec.  She has 2 bowel movements a day which are mostly solid.  She denies melena or bright red blood per rectum.  The patient's weight is stable.  She has osteoporosis and is scheduled to see an endocrinologist within the next 2 weeks.  She is on vitamin D3 supplementation.  She has a history of colonic polyps.\par \par \par Note from 10/17/2019 - The patient has celiac disease, dermatitis herpetiformis, and osteoporosis.  The patient is status post EGD performed on August 15, 2019.  EGD revealed a 5 cm hiatal hernia with mild erosive gastritis and flat duodenal bulb mucosa.  Biopsies from the distal duodenum showed normal villi with mildly increased intraepithelial lymphocytes but biopsies from the duodenal bulb continue to show partial villous atrophy also with increased intraepithelial lymphocytes.  The patient has been seeing Siva Johnson for nutritional counseling and reports being on a 100% gluten-free diet.  She has had no further itching or rash from the dermatitis herpetiformis and her dapsone dosage was decreased from 200-1 50.  She denies abdominal pain, bloating, heartburn, dysphasia.  Bowel movements are normal without diarrhea, constipation, melena, bright red blood per rectum.  The patient has had a cough and throat clearing.  She saw an ENT doctor who diagnosed her with reflux and placed her on omeprazole 40 mg a day which she has been taking since Friday.\par \par \par Note from 7/8/2019 - The patient has celiac disease, dermatitis herpetiformis, and gastric ulcers We reviewed the evaluations done since the patient's last visit on May 20, 2019. Blood work from that day revealed elevated anti-deamidated gliadin antibodies both IgG and IgA as well as positive tissue transglutaminase IgG and weakly positive tissue transglutaminase IgA. Vitamin D level was decreased at 19.7. The patient had a bone density scan which revealed evidence of osteoporosis. She has since begun vitamin D 3 2000 units a day and has seen an endocrinologist who repeated a bone density scan with more benign findings. Further workup as being done. The patient has been seen by nutritionist and states that she is on a 100% gluten free diet. She is understanding the concepts of contact and cross contamination. She is being followed by Dr. Mendez for her dermatitis herpetiformis. She reports ongoing lesions. She also complains of oral ulcers which are painful. She denies abdominal pain, bloating, heartburn, dysphagia, nausea, vomiting. Bowel movements are normal. The patient had an H. pylori breath test which was negative.\par \par \par Note from 5/20/19 - The patient is a 58-year-old woman who developed a pruritic rash initially on her hands in March of 2018. 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 The patient was treated with prednisone and hydroxychloroquine. Despite this the rash continued to spread to other parts of her body including her face. She switched to another dermatologist Dr. 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 Biopsies were taken with positive direct immunofluorescence consistent with this diagnosis. The patient has been on dapsone since November but the dose has been increased in March. At that time, the patient was advised to be evaluated for celiac disease. She had no symptoms referrable to the GI tract.\par \par The patient had blood work done in March that showed an elevated tissue transglutaminase IgA of 16.1 and a positive anti-endomysial antibody. She subsequently underwent EGD and colonoscopy on April 4, 2019 by another physician. EGD was significant for a 5 cm hiatal hernia with 3 prepyloric antral ulcers and mild scalloping noted. Biopsies from the duodenum showed mild to moderate villous blunting with minimally increased intraepithelial lymphocytes along with a finding of intestinal metaplasia and H. pylori in the stomach. Colonoscopy was significant only for diverticulosis. She does have a history of colonic polyps. Celiac genetic testing was done showing positive DQ2.  Blood work also showed a mild anemia with a hemoglobin and hematocrit of 10.7 and 34.5 respectively.\par \par The patient has been treated for H. pylori with 2 weeks of amoxicillin, clarithromycin, metronidazole, and pantoprazole.\par \par The patient denies abdominal pain, bloating, heartburn, or dysphagia. She is to solid bowel movements a day without melena or bright red blood per rectum. She has been gaining weight and is gained 10 pounds over the past month. She recently started on a gluten-free diet although she admits that she is cheated. She did develop a new hand lesion yesterday.

## 2021-01-22 ENCOUNTER — APPOINTMENT (OUTPATIENT)
Dept: DERMATOLOGY | Facility: CLINIC | Age: 61
End: 2021-01-22
Payer: COMMERCIAL

## 2021-01-22 PROCEDURE — 96910 PHOTCHMTX TAR&UVB/PTRLTM&UVB: CPT

## 2021-01-22 PROCEDURE — 99072 ADDL SUPL MATRL&STAF TM PHE: CPT

## 2021-01-25 ENCOUNTER — APPOINTMENT (OUTPATIENT)
Dept: DERMATOLOGY | Facility: CLINIC | Age: 61
End: 2021-01-25
Payer: COMMERCIAL

## 2021-01-25 PROCEDURE — 96910 PHOTCHMTX TAR&UVB/PTRLTM&UVB: CPT

## 2021-01-25 PROCEDURE — 99072 ADDL SUPL MATRL&STAF TM PHE: CPT

## 2021-01-27 ENCOUNTER — APPOINTMENT (OUTPATIENT)
Dept: DERMATOLOGY | Facility: CLINIC | Age: 61
End: 2021-01-27
Payer: COMMERCIAL

## 2021-01-27 PROCEDURE — 99072 ADDL SUPL MATRL&STAF TM PHE: CPT

## 2021-01-27 PROCEDURE — 96910 PHOTCHMTX TAR&UVB/PTRLTM&UVB: CPT

## 2021-01-29 ENCOUNTER — APPOINTMENT (OUTPATIENT)
Dept: DERMATOLOGY | Facility: CLINIC | Age: 61
End: 2021-01-29
Payer: COMMERCIAL

## 2021-01-29 ENCOUNTER — APPOINTMENT (OUTPATIENT)
Dept: DERMATOLOGY | Facility: CLINIC | Age: 61
End: 2021-01-29

## 2021-01-29 PROCEDURE — 96910 PHOTCHMTX TAR&UVB/PTRLTM&UVB: CPT

## 2021-01-29 PROCEDURE — 99072 ADDL SUPL MATRL&STAF TM PHE: CPT

## 2021-02-01 ENCOUNTER — NON-APPOINTMENT (OUTPATIENT)
Age: 61
End: 2021-02-01

## 2021-02-01 ENCOUNTER — APPOINTMENT (OUTPATIENT)
Dept: DERMATOLOGY | Facility: CLINIC | Age: 61
End: 2021-02-01

## 2021-02-01 LAB
25(OH)D3 SERPL-MCNC: 51.1 NG/ML
ALBUMIN SERPL ELPH-MCNC: 4.3 G/DL
ALP BLD-CCNC: 100 U/L
ALT SERPL-CCNC: 15 U/L
ANION GAP SERPL CALC-SCNC: 13 MMOL/L
AST SERPL-CCNC: 13 U/L
BASOPHILS # BLD AUTO: 0.02 K/UL
BASOPHILS NFR BLD AUTO: 0.2 %
BETA CAROTENE: 27 UG/DL
BILIRUB SERPL-MCNC: 0.4 MG/DL
BUN SERPL-MCNC: 12 MG/DL
CALCIUM SERPL-MCNC: 10.2 MG/DL
CHLORIDE SERPL-SCNC: 103 MMOL/L
CO2 SERPL-SCNC: 24 MMOL/L
CREAT SERPL-MCNC: 0.82 MG/DL
ENDOMYSIUM IGA SER QL: NEGATIVE
ENDOMYSIUM IGA TITR SER: NORMAL
EOSINOPHIL # BLD AUTO: 0.2 K/UL
EOSINOPHIL NFR BLD AUTO: 2.4 %
FERRITIN SERPL-MCNC: 25 NG/ML
FOLATE SERPL-MCNC: 19.6 NG/ML
GLIADIN IGA SER QL: 28.5 UNITS
GLIADIN IGG SER QL: 26.7 UNITS
GLIADIN PEPTIDE IGA SER-ACNC: ABNORMAL
GLIADIN PEPTIDE IGG SER-ACNC: ABNORMAL
GLUCOSE SERPL-MCNC: 78 MG/DL
HCT VFR BLD CALC: 36.6 %
HGB BLD-MCNC: 11.2 G/DL
IGA SER QL IEP: 162 MG/DL
IMM GRANULOCYTES NFR BLD AUTO: 0.4 %
IRON SATN MFR SERPL: 11 %
IRON SERPL-MCNC: 44 UG/DL
LYMPHOCYTES # BLD AUTO: 1.88 K/UL
LYMPHOCYTES NFR BLD AUTO: 22.3 %
MAGNESIUM SERPL-MCNC: 2.1 MG/DL
MAN DIFF?: NORMAL
MCHC RBC-ENTMCNC: 29.9 PG
MCHC RBC-ENTMCNC: 30.6 GM/DL
MCV RBC AUTO: 97.6 FL
MONOCYTES # BLD AUTO: 0.59 K/UL
MONOCYTES NFR BLD AUTO: 7 %
NEUTROPHILS # BLD AUTO: 5.72 K/UL
NEUTROPHILS NFR BLD AUTO: 67.7 %
PLATELET # BLD AUTO: 325 K/UL
POTASSIUM SERPL-SCNC: 4.4 MMOL/L
PROT SERPL-MCNC: 8.1 G/DL
RBC # BLD: 3.75 M/UL
RBC # FLD: 16.4 %
SODIUM SERPL-SCNC: 140 MMOL/L
TIBC SERPL-MCNC: 420 UG/DL
TSH SERPL-ACNC: 2.14 UIU/ML
TTG IGA SER IA-ACNC: 2.5 U/ML
TTG IGA SER-ACNC: NEGATIVE
TTG IGG SER IA-ACNC: 6.6 U/ML
TTG IGG SER IA-ACNC: ABNORMAL
UIBC SERPL-MCNC: 376 UG/DL
VIT A SERPL-MCNC: 40.1 UG/DL
VIT B12 SERPL-MCNC: 822 PG/ML
VIT B6 SERPL-MCNC: 13.9 UG/L
WBC # FLD AUTO: 8.44 K/UL
ZINC SERPL-MCNC: 79 UG/DL

## 2021-02-02 LAB — MENADIONE SERPL-MCNC: 0.71 NG/ML

## 2021-02-03 ENCOUNTER — APPOINTMENT (OUTPATIENT)
Dept: DERMATOLOGY | Facility: CLINIC | Age: 61
End: 2021-02-03

## 2021-02-04 LAB — SARS-COV-2 N GENE NPH QL NAA+PROBE: NOT DETECTED

## 2021-02-05 ENCOUNTER — APPOINTMENT (OUTPATIENT)
Dept: DERMATOLOGY | Facility: CLINIC | Age: 61
End: 2021-02-05
Payer: COMMERCIAL

## 2021-02-05 ENCOUNTER — TRANSCRIPTION ENCOUNTER (OUTPATIENT)
Age: 61
End: 2021-02-05

## 2021-02-05 PROCEDURE — 96910 PHOTCHMTX TAR&UVB/PTRLTM&UVB: CPT

## 2021-02-05 PROCEDURE — 99072 ADDL SUPL MATRL&STAF TM PHE: CPT

## 2021-02-08 ENCOUNTER — APPOINTMENT (OUTPATIENT)
Dept: GASTROENTEROLOGY | Facility: AMBULATORY MEDICAL SERVICES | Age: 61
End: 2021-02-08
Payer: COMMERCIAL

## 2021-02-08 ENCOUNTER — APPOINTMENT (OUTPATIENT)
Dept: DERMATOLOGY | Facility: CLINIC | Age: 61
End: 2021-02-08

## 2021-02-08 PROCEDURE — 45378 DIAGNOSTIC COLONOSCOPY: CPT

## 2021-02-08 PROCEDURE — 43239 EGD BIOPSY SINGLE/MULTIPLE: CPT

## 2021-02-10 ENCOUNTER — APPOINTMENT (OUTPATIENT)
Dept: DERMATOLOGY | Facility: CLINIC | Age: 61
End: 2021-02-10
Payer: COMMERCIAL

## 2021-02-10 PROCEDURE — 96910 PHOTCHMTX TAR&UVB/PTRLTM&UVB: CPT

## 2021-02-10 PROCEDURE — 99072 ADDL SUPL MATRL&STAF TM PHE: CPT

## 2021-02-12 ENCOUNTER — APPOINTMENT (OUTPATIENT)
Dept: DERMATOLOGY | Facility: CLINIC | Age: 61
End: 2021-02-12
Payer: COMMERCIAL

## 2021-02-12 PROCEDURE — 96910 PHOTCHMTX TAR&UVB/PTRLTM&UVB: CPT

## 2021-02-12 PROCEDURE — 99072 ADDL SUPL MATRL&STAF TM PHE: CPT

## 2021-02-17 ENCOUNTER — APPOINTMENT (OUTPATIENT)
Dept: DERMATOLOGY | Facility: CLINIC | Age: 61
End: 2021-02-17
Payer: COMMERCIAL

## 2021-02-17 PROCEDURE — 96910 PHOTCHMTX TAR&UVB/PTRLTM&UVB: CPT

## 2021-02-17 PROCEDURE — 99072 ADDL SUPL MATRL&STAF TM PHE: CPT

## 2021-02-19 ENCOUNTER — APPOINTMENT (OUTPATIENT)
Dept: DERMATOLOGY | Facility: CLINIC | Age: 61
End: 2021-02-19

## 2021-02-22 ENCOUNTER — APPOINTMENT (OUTPATIENT)
Dept: DERMATOLOGY | Facility: CLINIC | Age: 61
End: 2021-02-22

## 2021-02-24 ENCOUNTER — APPOINTMENT (OUTPATIENT)
Dept: DERMATOLOGY | Facility: CLINIC | Age: 61
End: 2021-02-24

## 2021-02-26 ENCOUNTER — APPOINTMENT (OUTPATIENT)
Dept: DERMATOLOGY | Facility: CLINIC | Age: 61
End: 2021-02-26

## 2021-03-02 ENCOUNTER — APPOINTMENT (OUTPATIENT)
Dept: GASTROENTEROLOGY | Facility: CLINIC | Age: 61
End: 2021-03-02
Payer: COMMERCIAL

## 2021-03-02 DIAGNOSIS — K57.30 DIVERTICULOSIS OF LARGE INTESTINE W/OUT PERFORATION OR ABSCESS W/OUT BLEEDING: ICD-10-CM

## 2021-03-02 DIAGNOSIS — K64.4 RESIDUAL HEMORRHOIDAL SKIN TAGS: ICD-10-CM

## 2021-03-02 DIAGNOSIS — K44.9 DIAPHRAGMATIC HERNIA W/OUT OBSTRUCTION OR GANGRENE: ICD-10-CM

## 2021-03-02 DIAGNOSIS — K21.9 GASTRO-ESOPHAGEAL REFLUX DISEASE W/OUT ESOPHAGITIS: ICD-10-CM

## 2021-03-02 DIAGNOSIS — K64.8 OTHER HEMORRHOIDS: ICD-10-CM

## 2021-03-02 DIAGNOSIS — R68.89 OTHER GENERAL SYMPTOMS AND SIGNS: ICD-10-CM

## 2021-03-02 PROCEDURE — 99215 OFFICE O/P EST HI 40 MIN: CPT | Mod: 95

## 2021-03-02 RX ORDER — SODIUM SULFATE, POTASSIUM SULFATE, MAGNESIUM SULFATE 17.5; 3.13; 1.6 G/ML; G/ML; G/ML
17.5-3.13-1.6 SOLUTION, CONCENTRATE ORAL
Qty: 1 | Refills: 0 | Status: DISCONTINUED | COMMUNITY
Start: 2021-02-03 | End: 2021-03-02

## 2021-03-02 RX ORDER — SODIUM PICOSULFATE, MAGNESIUM OXIDE, AND ANHYDROUS CITRIC ACID 10; 3.5; 12 MG/160ML; G/160ML; G/160ML
10-3.5-12 MG-GM LIQUID ORAL
Qty: 1 | Refills: 0 | Status: DISCONTINUED | COMMUNITY
Start: 2021-02-01 | End: 2021-03-02

## 2021-03-02 RX ORDER — PANTOPRAZOLE 40 MG/1
40 TABLET, DELAYED RELEASE ORAL
Qty: 90 | Refills: 1 | Status: ACTIVE | COMMUNITY
Start: 2021-03-02 | End: 1900-01-01

## 2021-03-02 NOTE — HISTORY OF PRESENT ILLNESS
[Home] : at home, [unfilled] , at the time of the visit. [Medical Office: ([facility])___] : at the medical office located in  [Verbal consent obtained from patient] : the patient, [unfilled] [FreeTextEntry1] : The patient has celiac disease and dermatitis herpetiformis.  We reviewed the evaluations done since the last visit on January 20, 2021.  Blood work from that day showed a borderline iron deficiency anemia with hemoglobin and hematocrit of 11.2 and 36.6 respectively with an 11% iron saturation and a ferritin of 25.  Celiac labs revealed a negative tissue transglutaminase IgA but weakly positive tissue transglutaminase IgG and weakly positive anti-deamidated gliadin IgG and IgA.  The patient underwent EGD and colonoscopy on February 8, 2021.  EGD was significant for a 3 cm hiatal hernia with biopsy showing evidence of reflux esophagitis.  The biopsies from the distal duodenum revealed normal villi but biopsies from the duodenal bulb showed increased intraepithelial lymphocytes.  Colonoscopy was significant for mild to moderate diverticulosis involving the descending and sigmoid colon along with internal and external hemorrhoids which are asymptomatic.  The patient denies heartburn, dysphagia, abdominal pain.  She does have frequent throat clearing.  She also continues to complain of bloating.\par \par \par Note from 1/20/2021 - The patient has celiac disease, dermatitis herpetiformis, 5 cm hiatal hernia, and history of colonic polyps.  When she was last seen in June 2020, she had weakly positive deaminase gliadin IgG, positive antiendomysial antibody, weakly positive tissue transglutaminase IgG, but normal tissue transglutaminase IgA.  She has never had symptoms of celiac disease but remains on a 100% gluten-free diet.  She is now receiving UVB phototherapy 3 times a week for her skin lesions which has helped significantly.  Her dose of dapsone has been decreased.  She notes that she hears gurgling in the left upper quadrant but she denies abdominal pain, bloating, heartburn, dysphagia.  She does note frequent throat clearing.  Bowel movements are normal with 2-3 solid bowel movements a day without melena or bright red blood per rectum.  The patient's weight is stable.  She has osteoporosis and is followed by endocrine for this.  The patient has not been admitted to the hospital in the past year and denies any cardiac issues.\par \par \par Note from 6/23/2020 - The patient has celiac disease and dermatitis herpetiformis.  Her last blood work done in October revealed celiac labs that were improving but not yet normal with a positive delaminated gliadin antibody IgG of 54.4 and a weakly positive IgA of 26.7.  Tissue transglutaminase antibodies were normal.  The patient reports being on a strictly gluten-free diet and is seeing her nutritionist weekly via telehealth services.  Despite this, the patient has had 2 outbreaks of dermatitis herpetiformis, both of which she relates to significant stress including the death of her father.  She is currently on a prednisone taper along with dapsone for the dermatitis herpetiformis and is followed by Dr. Mendez.  She is doing well from a GI standpoint denying abdominal pain, heartburn, dysphasia.  She is on Prilosec daily for her cough which she states is improved by the Prilosec.  She has 2 bowel movements a day which are mostly solid.  She denies melena or bright red blood per rectum.  The patient's weight is stable.  She has osteoporosis and is scheduled to see an endocrinologist within the next 2 weeks.  She is on vitamin D3 supplementation.  She has a history of colonic polyps.\par \par \par Note from 10/17/2019 - The patient has celiac disease, dermatitis herpetiformis, and osteoporosis.  The patient is status post EGD performed on August 15, 2019.  EGD revealed a 5 cm hiatal hernia with mild erosive gastritis and flat duodenal bulb mucosa.  Biopsies from the distal duodenum showed normal villi with mildly increased intraepithelial lymphocytes but biopsies from the duodenal bulb continue to show partial villous atrophy also with increased intraepithelial lymphocytes.  The patient has been seeing Siva Johnson for nutritional counseling and reports being on a 100% gluten-free diet.  She has had no further itching or rash from the dermatitis herpetiformis and her dapsone dosage was decreased from 200-1 50.  She denies abdominal pain, bloating, heartburn, dysphasia.  Bowel movements are normal without diarrhea, constipation, melena, bright red blood per rectum.  The patient has had a cough and throat clearing.  She saw an ENT doctor who diagnosed her with reflux and placed her on omeprazole 40 mg a day which she has been taking since Friday.\par \par \par Note from 7/8/2019 - The patient has celiac disease, dermatitis herpetiformis, and gastric ulcers We reviewed the evaluations done since the patient's last visit on May 20, 2019. Blood work from that day revealed elevated anti-deamidated gliadin antibodies both IgG and IgA as well as positive tissue transglutaminase IgG and weakly positive tissue transglutaminase IgA. Vitamin D level was decreased at 19.7. The patient had a bone density scan which revealed evidence of osteoporosis. She has since begun vitamin D 3 2000 units a day and has seen an endocrinologist who repeated a bone density scan with more benign findings. Further workup as being done. The patient has been seen by nutritionist and states that she is on a 100% gluten free diet. She is understanding the concepts of contact and cross contamination. She is being followed by Dr. Mendez for her dermatitis herpetiformis. She reports ongoing lesions. She also complains of oral ulcers which are painful. She denies abdominal pain, bloating, heartburn, dysphagia, nausea, vomiting. Bowel movements are normal. The patient had an H. pylori breath test which was negative.\par \par \par Note from 5/20/19 - The patient is a 58-year-old woman who developed a pruritic rash initially on her hands in March of 2018. 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 The patient was treated with prednisone and hydroxychloroquine. Despite this the rash continued to spread to other parts of her body including her face. She switched to another dermatologist Dr. 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 Biopsies were taken with positive direct immunofluorescence consistent with this diagnosis. The patient has been on dapsone since November but the dose has been increased in March. At that time, the patient was advised to be evaluated for celiac disease. She had no symptoms referrable to the GI tract.\par \par The patient had blood work done in March that showed an elevated tissue transglutaminase IgA of 16.1 and a positive anti-endomysial antibody. She subsequently underwent EGD and colonoscopy on April 4, 2019 by another physician. EGD was significant for a 5 cm hiatal hernia with 3 prepyloric antral ulcers and mild scalloping noted. Biopsies from the duodenum showed mild to moderate villous blunting with minimally increased intraepithelial lymphocytes along with a finding of intestinal metaplasia and H. pylori in the stomach. Colonoscopy was significant only for diverticulosis. She does have a history of colonic polyps. Celiac genetic testing was done showing positive DQ2.  Blood work also showed a mild anemia with a hemoglobin and hematocrit of 10.7 and 34.5 respectively.\par \par The patient has been treated for H. pylori with 2 weeks of amoxicillin, clarithromycin, metronidazole, and pantoprazole.\par \par The patient denies abdominal pain, bloating, heartburn, or dysphagia. She is to solid bowel movements a day without melena or bright red blood per rectum. She has been gaining weight and is gained 10 pounds over the past month. She recently started on a gluten-free diet although she admits that she is cheated. She did develop a new hand lesion yesterday.

## 2021-03-02 NOTE — CONSULT LETTER
[FreeTextEntry1] : Dear Dr. Preethi Nash and Dr. Bianka Mendez,\par \par I had the pleasure of seeing your patient ELLIOT CRUZ in the office today.  My office note is attached. PLEASE READ THE "ASSESSMENT" SECTION OF THE NOTE TO SEE MY IMPRESSION AND PLAN.\par \par Thank you very much for allowing me to participate in the care of your patient.\par \par Sincerely,\par \par Bakari Johnson M.D., FACG, FACP\par Director, Celiac Program at [facility]  of Medicine\par [place] [employer] at [facility] Practice Director,\[facility] - Gastroenterology/Internal Medicine at [place]\[facility] [address]\[place] [address]\par Tel: [phone]\par Email: [email or web address]\par \par \[facility] The attached note has been created using a voice recognition system (Dragon).  There may be some misspellings and typos.  Please call my office if you have any issues or questions.

## 2021-03-02 NOTE — REASON FOR VISIT
[FreeTextEntry1] : Celiac disease, dermatitis herpetiformis, iron deficiency anemia, reflux esophagitis, hiatal hernia, diverticulosis, hemorrhoids, throat clearing

## 2021-03-03 ENCOUNTER — APPOINTMENT (OUTPATIENT)
Dept: DERMATOLOGY | Facility: CLINIC | Age: 61
End: 2021-03-03
Payer: COMMERCIAL

## 2021-03-03 PROCEDURE — 96910 PHOTCHMTX TAR&UVB/PTRLTM&UVB: CPT

## 2021-03-03 PROCEDURE — 99072 ADDL SUPL MATRL&STAF TM PHE: CPT

## 2021-03-05 ENCOUNTER — APPOINTMENT (OUTPATIENT)
Dept: DERMATOLOGY | Facility: CLINIC | Age: 61
End: 2021-03-05
Payer: COMMERCIAL

## 2021-03-05 PROCEDURE — 99072 ADDL SUPL MATRL&STAF TM PHE: CPT

## 2021-03-05 PROCEDURE — 96910 PHOTCHMTX TAR&UVB/PTRLTM&UVB: CPT

## 2021-03-08 ENCOUNTER — APPOINTMENT (OUTPATIENT)
Dept: DERMATOLOGY | Facility: CLINIC | Age: 61
End: 2021-03-08
Payer: COMMERCIAL

## 2021-03-08 ENCOUNTER — APPOINTMENT (OUTPATIENT)
Dept: DERMATOLOGY | Facility: CLINIC | Age: 61
End: 2021-03-08

## 2021-03-08 DIAGNOSIS — Z51.81 ENCOUNTER FOR THERAPEUTIC DRUG LVL MONITORING: ICD-10-CM

## 2021-03-08 PROCEDURE — 99072 ADDL SUPL MATRL&STAF TM PHE: CPT

## 2021-03-08 PROCEDURE — 99214 OFFICE O/P EST MOD 30 MIN: CPT

## 2021-03-09 LAB
ALBUMIN SERPL ELPH-MCNC: 4.2 G/DL
ALP BLD-CCNC: 106 U/L
ALT SERPL-CCNC: 12 U/L
AST SERPL-CCNC: 17 U/L
BASOPHILS # BLD AUTO: 0.03 K/UL
BASOPHILS NFR BLD AUTO: 0.4 %
BILIRUB DIRECT SERPL-MCNC: 0.1 MG/DL
BILIRUB INDIRECT SERPL-MCNC: 0.1 MG/DL
BILIRUB SERPL-MCNC: 0.2 MG/DL
EOSINOPHIL # BLD AUTO: 0.25 K/UL
EOSINOPHIL NFR BLD AUTO: 2.9 %
HCT VFR BLD CALC: 37 %
HGB BLD-MCNC: 11.3 G/DL
IMM GRANULOCYTES NFR BLD AUTO: 0.2 %
LYMPHOCYTES # BLD AUTO: 2.28 K/UL
LYMPHOCYTES NFR BLD AUTO: 26.8 %
MAN DIFF?: NORMAL
MCHC RBC-ENTMCNC: 28.3 PG
MCHC RBC-ENTMCNC: 30.5 GM/DL
MCV RBC AUTO: 92.5 FL
MONOCYTES # BLD AUTO: 0.76 K/UL
MONOCYTES NFR BLD AUTO: 8.9 %
NEUTROPHILS # BLD AUTO: 5.17 K/UL
NEUTROPHILS NFR BLD AUTO: 60.8 %
PLATELET # BLD AUTO: 315 K/UL
PROT SERPL-MCNC: 7.9 G/DL
RBC # BLD: 4 M/UL
RBC # BLD: 4 M/UL
RBC # FLD: 15.6 %
RETICS # AUTO: 1.3 %
RETICS AGGREG/RBC NFR: 50 K/UL
WBC # FLD AUTO: 8.51 K/UL

## 2021-03-10 ENCOUNTER — APPOINTMENT (OUTPATIENT)
Dept: DERMATOLOGY | Facility: CLINIC | Age: 61
End: 2021-03-10
Payer: COMMERCIAL

## 2021-03-10 PROCEDURE — 96910 PHOTCHMTX TAR&UVB/PTRLTM&UVB: CPT

## 2021-03-10 PROCEDURE — 99072 ADDL SUPL MATRL&STAF TM PHE: CPT

## 2021-03-11 ENCOUNTER — NON-APPOINTMENT (OUTPATIENT)
Age: 61
End: 2021-03-11

## 2021-03-12 ENCOUNTER — APPOINTMENT (OUTPATIENT)
Dept: DERMATOLOGY | Facility: CLINIC | Age: 61
End: 2021-03-12
Payer: COMMERCIAL

## 2021-03-12 PROCEDURE — 96910 PHOTCHMTX TAR&UVB/PTRLTM&UVB: CPT

## 2021-03-12 PROCEDURE — 99072 ADDL SUPL MATRL&STAF TM PHE: CPT

## 2021-03-15 ENCOUNTER — APPOINTMENT (OUTPATIENT)
Dept: DERMATOLOGY | Facility: CLINIC | Age: 61
End: 2021-03-15

## 2021-03-17 ENCOUNTER — APPOINTMENT (OUTPATIENT)
Dept: DERMATOLOGY | Facility: CLINIC | Age: 61
End: 2021-03-17
Payer: COMMERCIAL

## 2021-03-17 PROCEDURE — 96910 PHOTCHMTX TAR&UVB/PTRLTM&UVB: CPT

## 2021-03-17 PROCEDURE — 99072 ADDL SUPL MATRL&STAF TM PHE: CPT

## 2021-03-19 ENCOUNTER — APPOINTMENT (OUTPATIENT)
Dept: DERMATOLOGY | Facility: CLINIC | Age: 61
End: 2021-03-19
Payer: COMMERCIAL

## 2021-03-19 PROCEDURE — 96910 PHOTCHMTX TAR&UVB/PTRLTM&UVB: CPT

## 2021-03-19 PROCEDURE — 99072 ADDL SUPL MATRL&STAF TM PHE: CPT

## 2021-03-22 ENCOUNTER — APPOINTMENT (OUTPATIENT)
Dept: DERMATOLOGY | Facility: CLINIC | Age: 61
End: 2021-03-22

## 2021-03-24 ENCOUNTER — APPOINTMENT (OUTPATIENT)
Dept: DERMATOLOGY | Facility: CLINIC | Age: 61
End: 2021-03-24
Payer: COMMERCIAL

## 2021-03-24 PROCEDURE — 96910 PHOTCHMTX TAR&UVB/PTRLTM&UVB: CPT

## 2021-03-24 PROCEDURE — 99072 ADDL SUPL MATRL&STAF TM PHE: CPT

## 2021-03-26 ENCOUNTER — APPOINTMENT (OUTPATIENT)
Dept: DERMATOLOGY | Facility: CLINIC | Age: 61
End: 2021-03-26
Payer: COMMERCIAL

## 2021-03-26 PROCEDURE — 99072 ADDL SUPL MATRL&STAF TM PHE: CPT

## 2021-03-26 PROCEDURE — 96910 PHOTCHMTX TAR&UVB/PTRLTM&UVB: CPT

## 2021-03-29 ENCOUNTER — APPOINTMENT (OUTPATIENT)
Dept: DERMATOLOGY | Facility: CLINIC | Age: 61
End: 2021-03-29

## 2021-03-31 ENCOUNTER — APPOINTMENT (OUTPATIENT)
Dept: DERMATOLOGY | Facility: CLINIC | Age: 61
End: 2021-03-31
Payer: COMMERCIAL

## 2021-03-31 PROCEDURE — 99072 ADDL SUPL MATRL&STAF TM PHE: CPT

## 2021-03-31 PROCEDURE — 96910 PHOTCHMTX TAR&UVB/PTRLTM&UVB: CPT

## 2021-04-01 ENCOUNTER — APPOINTMENT (OUTPATIENT)
Dept: GASTROENTEROLOGY | Facility: CLINIC | Age: 61
End: 2021-04-01

## 2021-04-02 ENCOUNTER — APPOINTMENT (OUTPATIENT)
Dept: DERMATOLOGY | Facility: CLINIC | Age: 61
End: 2021-04-02
Payer: COMMERCIAL

## 2021-04-02 PROCEDURE — 99072 ADDL SUPL MATRL&STAF TM PHE: CPT

## 2021-04-02 PROCEDURE — 96910 PHOTCHMTX TAR&UVB/PTRLTM&UVB: CPT

## 2021-04-07 ENCOUNTER — APPOINTMENT (OUTPATIENT)
Dept: GASTROENTEROLOGY | Facility: CLINIC | Age: 61
End: 2021-04-07

## 2021-04-09 ENCOUNTER — APPOINTMENT (OUTPATIENT)
Dept: DERMATOLOGY | Facility: CLINIC | Age: 61
End: 2021-04-09
Payer: COMMERCIAL

## 2021-04-09 PROCEDURE — 96910 PHOTCHMTX TAR&UVB/PTRLTM&UVB: CPT

## 2021-04-09 PROCEDURE — 99072 ADDL SUPL MATRL&STAF TM PHE: CPT

## 2021-04-16 ENCOUNTER — APPOINTMENT (OUTPATIENT)
Dept: DERMATOLOGY | Facility: CLINIC | Age: 61
End: 2021-04-16
Payer: COMMERCIAL

## 2021-04-16 PROCEDURE — 96910 PHOTCHMTX TAR&UVB/PTRLTM&UVB: CPT

## 2021-04-16 PROCEDURE — 99072 ADDL SUPL MATRL&STAF TM PHE: CPT

## 2021-04-23 ENCOUNTER — APPOINTMENT (OUTPATIENT)
Dept: DERMATOLOGY | Facility: CLINIC | Age: 61
End: 2021-04-23
Payer: COMMERCIAL

## 2021-04-23 PROCEDURE — 96910 PHOTCHMTX TAR&UVB/PTRLTM&UVB: CPT

## 2021-04-23 PROCEDURE — 99072 ADDL SUPL MATRL&STAF TM PHE: CPT

## 2021-04-28 ENCOUNTER — APPOINTMENT (OUTPATIENT)
Dept: DERMATOLOGY | Facility: CLINIC | Age: 61
End: 2021-04-28
Payer: COMMERCIAL

## 2021-04-28 PROCEDURE — 96910 PHOTCHMTX TAR&UVB/PTRLTM&UVB: CPT

## 2021-04-28 PROCEDURE — 99072 ADDL SUPL MATRL&STAF TM PHE: CPT

## 2021-04-30 ENCOUNTER — APPOINTMENT (OUTPATIENT)
Dept: DERMATOLOGY | Facility: CLINIC | Age: 61
End: 2021-04-30

## 2021-05-10 ENCOUNTER — APPOINTMENT (OUTPATIENT)
Dept: DERMATOLOGY | Facility: CLINIC | Age: 61
End: 2021-05-10
Payer: COMMERCIAL

## 2021-05-10 DIAGNOSIS — L29.9 PRURITUS, UNSPECIFIED: ICD-10-CM

## 2021-05-10 PROCEDURE — 99072 ADDL SUPL MATRL&STAF TM PHE: CPT

## 2021-05-10 PROCEDURE — 99214 OFFICE O/P EST MOD 30 MIN: CPT

## 2021-08-04 ENCOUNTER — APPOINTMENT (OUTPATIENT)
Dept: GASTROENTEROLOGY | Facility: CLINIC | Age: 61
End: 2021-08-04
Payer: COMMERCIAL

## 2021-08-04 VITALS
HEIGHT: 62 IN | DIASTOLIC BLOOD PRESSURE: 70 MMHG | BODY MASS INDEX: 51.89 KG/M2 | SYSTOLIC BLOOD PRESSURE: 108 MMHG | HEART RATE: 84 BPM | WEIGHT: 282 LBS | TEMPERATURE: 97.1 F | OXYGEN SATURATION: 98 %

## 2021-08-04 DIAGNOSIS — D50.9 IRON DEFICIENCY ANEMIA, UNSPECIFIED: ICD-10-CM

## 2021-08-04 DIAGNOSIS — K90.0 CELIAC DISEASE: ICD-10-CM

## 2021-08-04 DIAGNOSIS — Z86.010 PERSONAL HISTORY OF COLONIC POLYPS: ICD-10-CM

## 2021-08-04 DIAGNOSIS — K21.00 GASTRO-ESOPHAGEAL REFLUX DISEASE WITH ESOPHAGITIS, WITHOUT BLEEDING: ICD-10-CM

## 2021-08-04 DIAGNOSIS — L13.0 DERMATITIS HERPETIFORMIS: ICD-10-CM

## 2021-08-04 PROCEDURE — 99214 OFFICE O/P EST MOD 30 MIN: CPT | Mod: 25

## 2021-08-04 PROCEDURE — 36415 COLL VENOUS BLD VENIPUNCTURE: CPT

## 2021-08-04 NOTE — PHYSICAL EXAM
[General Appearance - In No Acute Distress] : in no acute distress [General Appearance - Alert] : alert [Neck Appearance] : the appearance of the neck was normal [Neck Cervical Mass (___cm)] : no neck mass was observed [Jugular Venous Distention Increased] : there was no jugular-venous distention [Thyroid Diffuse Enlargement] : the thyroid was not enlarged [Thyroid Nodule] : there were no palpable thyroid nodules [Auscultation Breath Sounds / Voice Sounds] : lungs were clear to auscultation bilaterally [Heart Rate And Rhythm] : heart rate was normal and rhythm regular [Heart Sounds] : normal S1 and S2 [Heart Sounds Gallop] : no gallops [Murmurs] : no murmurs [Heart Sounds Pericardial Friction Rub] : no pericardial rub [Edema] : there was no peripheral edema [Bowel Sounds] : normal bowel sounds [Abdomen Soft] : soft [Abdomen Tenderness] : non-tender [] : no hepato-splenomegaly [Abdomen Mass (___ Cm)] : no abdominal mass palpated [No CVA Tenderness] : no ~M costovertebral angle tenderness [No Spinal Tenderness] : no spinal tenderness [Oriented To Time, Place, And Person] : oriented to person, place, and time [Impaired Insight] : insight and judgment were intact [Affect] : the affect was normal

## 2021-08-05 PROBLEM — D50.9 ANEMIA, IRON DEFICIENCY: Status: ACTIVE | Noted: 2021-02-01

## 2021-08-05 PROBLEM — K21.00 GASTROESOPHAGEAL REFLUX DISEASE WITH ESOPHAGITIS: Status: ACTIVE | Noted: 2021-03-02

## 2021-08-05 PROBLEM — Z86.010 HISTORY OF COLON POLYPS: Status: ACTIVE | Noted: 2018-12-28

## 2021-08-05 NOTE — REASON FOR VISIT
[FreeTextEntry1] : Celiac disease, dermatitis herpetiformis, iron deficiency anemia, reflux esophagitis, history of colonic polyps

## 2021-08-05 NOTE — HISTORY OF PRESENT ILLNESS
[FreeTextEntry1] : The patient has celiac disease, dermatitis herpetiformis, 3 cm hiatal hernia with reflux esophagitis on biopsies, history of iron deficiency anemia, and history of colonic polyps.  She took pantoprazole 40 mg a day for about a month and had complete resolution of her throat clearing.  She has been off the medication since May.  She continues to have no throat clearing, heartburn, dysphagia.  She denies abdominal pain or bloating.  She has 2-3 solid bowel movements a day without melena or bright red blood per rectum.  She reports being on a strict 100% gluten-free diet.  She is working with a dietitian.  Of note, the patient had increased intraepithelial lymphocytes on previous biopsies with weakly positive antideaminase gliadin antibody IgA and IgG and weakly positive tissue transglutaminase IgA.  Her only complaint is tingling and numbness in her feet.  The patient never had the recommended capsule endoscopy.  Of note, the patient has moved to [facility].\par \par \par Note from 3/2/2021 - The patient has celiac disease and dermatitis herpetiformis.  We reviewed the evaluations done since the last visit on January 20, 2021.  Blood work from that day showed a borderline iron deficiency anemia with hemoglobin and hematocrit of 11.2 and 36.6 respectively with an 11% iron saturation and a ferritin of 25.  Celiac labs revealed a negative tissue transglutaminase IgA but weakly positive tissue transglutaminase IgG and weakly positive anti-deamidated gliadin IgG and IgA.  The patient underwent EGD and colonoscopy on February 8, 2021.  EGD was significant for a 3 cm hiatal hernia with biopsy showing evidence of reflux esophagitis.  The biopsies from the distal duodenum revealed normal villi but biopsies from the duodenal bulb showed increased intraepithelial lymphocytes.  Colonoscopy was significant for mild to moderate diverticulosis involving the descending and sigmoid colon along with internal and external hemorrhoids which are asymptomatic.  The patient denies heartburn, dysphagia, abdominal pain.  She does have frequent throat clearing.  She also continues to complain of bloating.\par \par \par Note from 1/20/2021 - The patient has celiac disease, dermatitis herpetiformis, 5 cm hiatal hernia, and history of colonic polyps.  When she was last seen in June 2020, she had weakly positive deaminase gliadin IgG, positive antiendomysial antibody, weakly positive tissue transglutaminase IgG, but normal tissue transglutaminase IgA.  She has never had symptoms of celiac disease but remains on a 100% gluten-free diet.  She is now receiving UVB phototherapy 3 times a week for her skin lesions which has helped significantly.  Her dose of dapsone has been decreased.  She notes that she hears gurgling in the left upper quadrant but she denies abdominal pain, bloating, heartburn, dysphagia.  She does note frequent throat clearing.  Bowel movements are normal with 2-3 solid bowel movements a day without melena or bright red blood per rectum.  The patient's weight is stable.  She has osteoporosis and is followed by endocrine for this.  The patient has not been admitted to the hospital in the past year and denies any cardiac issues.\par \par \par Note from 6/23/2020 - The patient has celiac disease and dermatitis herpetiformis.  Her last blood work done in October revealed celiac labs that were improving but not yet normal with a positive delaminated gliadin antibody IgG of 54.4 and a weakly positive IgA of 26.7.  Tissue transglutaminase antibodies were normal.  The patient reports being on a strictly gluten-free diet and is seeing her nutritionist weekly via telehealth services.  Despite this, the patient has had 2 outbreaks of dermatitis herpetiformis, both of which she relates to significant stress including the death of her father.  She is currently on a prednisone taper along with dapsone for the dermatitis herpetiformis and is followed by Dr. Mendez.  She is doing well from a GI standpoint denying abdominal pain, heartburn, dysphasia.  She is on Prilosec daily for her cough which she states is improved by the Prilosec.  She has 2 bowel movements a day which are mostly solid.  She denies melena or bright red blood per rectum.  The patient's weight is stable.  She has osteoporosis and is scheduled to see an endocrinologist within the next 2 weeks.  She is on vitamin D3 supplementation.  She has a history of colonic polyps.\par \par \par Note from 10/17/2019 - The patient has celiac disease, dermatitis herpetiformis, and osteoporosis.  The patient is status post EGD performed on August 15, 2019.  EGD revealed a 5 cm hiatal hernia with mild erosive gastritis and flat duodenal bulb mucosa.  Biopsies from the distal duodenum showed normal villi with mildly increased intraepithelial lymphocytes but biopsies from the duodenal bulb continue to show partial villous atrophy also with increased intraepithelial lymphocytes.  The patient has been seeing Siva Johnson for nutritional counseling and reports being on a 100% gluten-free diet.  She has had no further itching or rash from the dermatitis herpetiformis and her dapsone dosage was decreased from 200-1 50.  She denies abdominal pain, bloating, heartburn, dysphasia.  Bowel movements are normal without diarrhea, constipation, melena, bright red blood per rectum.  The patient has had a cough and throat clearing.  She saw an ENT doctor who diagnosed her with reflux and placed her on omeprazole 40 mg a day which she has been taking since Friday.\par \par \par Note from 7/8/2019 - The patient has celiac disease, dermatitis herpetiformis, and gastric ulcers We reviewed the evaluations done since the patient's last visit on May 20, 2019. Blood work from that day revealed elevated anti-deamidated gliadin antibodies both IgG and IgA as well as positive tissue transglutaminase IgG and weakly positive tissue transglutaminase IgA. Vitamin D level was decreased at 19.7. The patient had a bone density scan which revealed evidence of osteoporosis. She has since begun vitamin D 3 2000 units a day and has seen an endocrinologist who repeated a bone density scan with more benign findings. Further workup as being done. The patient has been seen by nutritionist and states that she is on a 100% gluten free diet. She is understanding the concepts of contact and cross contamination. She is being followed by Dr. Mendez for her dermatitis herpetiformis. She reports ongoing lesions. She also complains of oral ulcers which are painful. She denies abdominal pain, bloating, heartburn, dysphagia, nausea, vomiting. Bowel movements are normal. The patient had an H. pylori breath test which was negative.\par \par \par Note from 5/20/19 - The patient is a 58-year-old woman who developed a pruritic rash initially on her hands in March of 2018. 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 The patient was treated with prednisone and hydroxychloroquine. Despite this the rash continued to spread to other parts of her body including her face. She switched to another dermatologist Dr. 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 Biopsies were taken with positive direct immunofluorescence consistent with this diagnosis. The patient has been on dapsone since November but the dose has been increased in March. At that time, the patient was advised to be evaluated for celiac disease. She had no symptoms referrable to the GI tract.\par \par The patient had blood work done in March that showed an elevated tissue transglutaminase IgA of 16.1 and a positive anti-endomysial antibody. She subsequently underwent EGD and colonoscopy on April 4, 2019 by another physician. EGD was significant for a 5 cm hiatal hernia with 3 prepyloric antral ulcers and mild scalloping noted. Biopsies from the duodenum showed mild to moderate villous blunting with minimally increased intraepithelial lymphocytes along with a finding of intestinal metaplasia and H. pylori in the stomach. Colonoscopy was significant only for diverticulosis. She does have a history of colonic polyps. Celiac genetic testing was done showing positive DQ2.  Blood work also showed a mild anemia with a hemoglobin and hematocrit of 10.7 and 34.5 respectively.\par \par The patient has been treated for H. pylori with 2 weeks of amoxicillin, clarithromycin, metronidazole, and pantoprazole.\par \par The patient denies abdominal pain, bloating, heartburn, or dysphagia. She is to solid bowel movements a day without melena or bright red blood per rectum. She has been gaining weight and is gained 10 pounds over the past month. She recently started on a gluten-free diet although she admits that she is cheated. She did develop a new hand lesion yesterday.

## 2021-08-05 NOTE — ASSESSMENT
[FreeTextEntry1] : Patient with celiac disease, dermatitis herpetiformis, reflux esophagitis on esophageal biopsies, borderline iron deficiency anemia, history of colonic polyps.  She is doing well although her most recent endoscopy still had increased intraepithelial lymphocytes on biopsies with normal villi and her last blood tests had weakly positive celiac serologies.\par \par Bloodwork was sent for CBC, chem-pack, TSH, celiac markers, iron studies, B12, folate, vitamin A, vitamin D, vitamin K, magnesium, carotene, vitamin B6, zinc.\par \par If the patient continues to have iron deficiency, we would then pursue capsule endoscopy.\par \par We are planning to repeat EGD in February 2022 to see if the biopsies fully normalize.\par \par Patient is due for colonoscopy in February 2024.\par \par I advised the patient to see a neurologist regarding her symptoms of neuropathy.  I explained to her that celiac disease can be associated with a number of symptoms but we must consider all etiologies.\par \par \par Plan from 3/2/2021 - Patient with celiac disease and dermatitis herpetiformis who has negative tissue transglutaminase IgA but otherwise weakly positive celiac markers with normal villi on biopsies but increased intraepithelial lymphocytes in the duodenal bulb.  She has a borderline iron deficiency anemia.  She has a 3 cm hiatal hernia with biopsies showing reflux esophagitis.  She complains of frequent throat clearing.  She also has diverticulosis.\par \par We discussed the importance of these findings and the possible suggestion that she may have inadvertent gluten exposure.  We also discussed the importance of a high-fiber gluten-free diet.  The patient is working with a dietitian and I advised that she continue to do this to be sure that there is no hidden gluten in her diet.\par \par The patient was started on pantoprazole 40 mg a day which she will need to take for at least 3 months to see if this helps with her throat clearing.\par \par In order to complete the GI evaluation of her iron deficiency anemia, A capsule endoscopy has been scheduled. The risks, benefits, alternatives, and limitations of the procedure were explained.\par \par We will repeat an EGD in 1 year that is February 2022.  Patient is due for her next colonoscopy in April 2022 for her history of colonic polyps.\par \par Patient will return to see me in 3 months.\par \par \par Plan from 1/20/2021 - Patient with celiac disease, dermatitis herpetiformis, 5 cm hiatal hernia, and a history of colonic polyps.  She has had frequent throat clearing but otherwise feels well.  She is on a 100% gluten-free diet.\par \par Bloodwork was sent for CBC, chem-pack, TSH, celiac markers, iron studies, B12, folate, vitamin A, vitamin D, vitamin K, magnesium, carotene, vitamin B6, zinc.\par \par An EGD has been scheduled. The risks, benefits, alternatives, and limitations of the procedure were explained.  The patient will require anesthesia evaluation prior to the procedure given her BMI of 53.04.\par \par Patient is due for colonoscopy in April 2022.\par \par \par Plan from 6/23/2020 - Patient with celiac disease and dermatitis herpetiformis.  She reports being on a strictly gluten-free diet but has had 2 outbreaks of dermatitis herpetiformis and is currently on a prednisone taper and dapsone.  She has osteoporosis and is to see an endocrinologist.  She has a history of colonic polyps.\par \par I had a long discussion with the patient regarding her celiac disease.  She was advised to continue the gluten-free diet and to follow-up with her nutritionist.\par \par Bloodwork was sent for CBC, chem-pack, TSH, celiac markers, iron studies, B12, folate, vitamin A, vitamin D, vitamin K, magnesium, carotene, vitamin B6, zinc.\par \par I advised the patient that we would hope to see normalization of the celiac serologies at this point.  We will need to repeat an endoscopy in the near future to ultimately assess response as she had ongoing partial villous atrophy on her last endoscopy.\par \par Patient will return to see me in 3 months.\par \par Patient is due for colonoscopy in April 2022.\par \par \par Plan from 10/17/2019 - Patient with celiac disease and dermatitis herpetiformis.  Her endoscopy still shows partial villous atrophy but she has now been on a much more strict gluten-free diet.  She has osteoporosis.  An ENT doctor recently diagnosed her with reflux as the cause of her cough and throat clearing and started her on omeprazole 40 mg a day.\par \par Patient will continue a strict 100% gluten-free diet.\par \par Bloodwork was sent for CBC, chem-pack, TSH, celiac markers, iron studies, B12, folate, vitamin D, and magnesium.\par \par Patient was advised to see an endocrinologist regarding treatment of her osteoporosis.\par \par Patient was advised to take vitamin D3 2000 international units a day.\par \par Patient will continue omeprazole 40 mg a day and we will observe for improvement of her symptoms of cough and throat clearing.  She does have a large hiatal hernia.\par \par Patient will return to see me in 3 months.\par \par \par Plan from 7/8/2019 - Patient with celiac disease and dermatitis herpetiformis. She is just recently started on a strict 100% gluten free diet but is now having the assistance of a skilled nutritionist and his understanding the diet better. She has a history of gastric ulcers with H. pylori which has been proven to be eradicated. She has vitamin D deficiency but is started on vitamin D 3 treatment. There was evidence of osteoporosis on bone density scan and the patient is now being followed by an endocrinologist. The patient is followed by Dr. Mendez for her dermatitis herpetiformis.\par \par I had a long discussion with the patient regarding her disease. She understands the need to be strictly gluten free. I also explained to her that resolution of symptoms may take some time.\par \par The patient was advised to followup with all of the practitioners discussed above.\par \par Patient will return to see me in September. At that time, we will schedule an EGD to follow up the gastric ulcers and repeat blood work.\par \par \par Plan from 5/20/19 - Patient with dermatitis herpetiformis with consistent skin biopsies. Because this disease is strongly associated with celiac disease, the patient underwent evaluation for celiac disease. In the process, the patient was found to have 3 prepyloric ulcers with the presence of H. pylori. She is now being treated for H. pylori.\par \par I had a very long conversation with the patient regarding celiac disease, how it was diagnosed, there was manifestations and potential complications, and its treatment. I explained that the diagnosis of celiac disease is made by a combination of multiple studies. Serologies revealed an elevated tissue transglutaminase IgA consistent with celiac disease. Duodenal biopsies showed mild to moderate villous blunting with minimally increased intraepithelial lymphocytes. On his biopsy findings are nonspecific for celiac disease, they are consistent. The patient has positive genetic testing for celiac disease as well. I explained to her that, even with the diagnosis of dermatitis herpetiformis alone but especially in concert with celiac disease, she will need to be on a strict 100% gluten free diet free of contact or cross contamination with gluten for the rest of her life.\par \par Information was given to the patient regarding celiac disease and a gluten free diet. The patient was also given a list of local restaurants that are "celiac friendly".\par \par Bloodwork was sent for CBC, chem-pack, TSH, celiac markers, iron studies, B12, folate, vitamin D, and magnesium.\par \par Patient was sent for a bone density scan.\par \par The patient was given the name of a nutitionist, Siva Johnson, to see.\par \par The patient is currently off of her proton pump inhibitor for 2 weeks and will be going for H. pylori breath test in early June. She will have these results forwarded to me. She will then go back on pantoprazole daily for ulcer healing.\par \par The patient will need a colonoscopy in 3-5 years.\par \par Patient will return to see me in 2 months. She will require eventual EGD to assess for ulcer healing and to evaluate for resolution of the small bowel biopsy findings.

## 2021-08-08 LAB
25(OH)D3 SERPL-MCNC: 44.3 NG/ML
ALBUMIN SERPL ELPH-MCNC: 4.4 G/DL
ALP BLD-CCNC: 112 U/L
ALT SERPL-CCNC: 14 U/L
ANION GAP SERPL CALC-SCNC: 11 MMOL/L
AST SERPL-CCNC: 17 U/L
BASOPHILS # BLD AUTO: 0.04 K/UL
BASOPHILS NFR BLD AUTO: 0.4 %
BETA CAROTENE: 33 UG/DL
BILIRUB SERPL-MCNC: 0.3 MG/DL
BUN SERPL-MCNC: 15 MG/DL
CALCIUM SERPL-MCNC: 10.2 MG/DL
CHLORIDE SERPL-SCNC: 102 MMOL/L
CO2 SERPL-SCNC: 26 MMOL/L
CREAT SERPL-MCNC: 0.69 MG/DL
EOSINOPHIL # BLD AUTO: 0.25 K/UL
EOSINOPHIL NFR BLD AUTO: 2.3 %
FERRITIN SERPL-MCNC: 11 NG/ML
FOLATE SERPL-MCNC: >20 NG/ML
GGT SERPL-CCNC: 12 U/L
GLIADIN IGA SER QL: 56.1 UNITS
GLIADIN IGG SER QL: 42.8 UNITS
GLIADIN PEPTIDE IGA SER-ACNC: POSITIVE
GLIADIN PEPTIDE IGG SER-ACNC: POSITIVE
GLUCOSE SERPL-MCNC: 85 MG/DL
HCT VFR BLD CALC: 38.6 %
HGB BLD-MCNC: 11.8 G/DL
IGA SER QL IEP: 184 MG/DL
IMM GRANULOCYTES NFR BLD AUTO: 0.2 %
IRON SATN MFR SERPL: 6 %
IRON SERPL-MCNC: 30 UG/DL
LYMPHOCYTES # BLD AUTO: 2.72 K/UL
LYMPHOCYTES NFR BLD AUTO: 25.2 %
MAGNESIUM SERPL-MCNC: 2 MG/DL
MAN DIFF?: NORMAL
MCHC RBC-ENTMCNC: 26.6 PG
MCHC RBC-ENTMCNC: 30.6 GM/DL
MCV RBC AUTO: 87.1 FL
MONOCYTES # BLD AUTO: 0.79 K/UL
MONOCYTES NFR BLD AUTO: 7.3 %
NEUTROPHILS # BLD AUTO: 6.98 K/UL
NEUTROPHILS NFR BLD AUTO: 64.6 %
PLATELET # BLD AUTO: 365 K/UL
POTASSIUM SERPL-SCNC: 3.9 MMOL/L
PROT SERPL-MCNC: 8.3 G/DL
RBC # BLD: 4.43 M/UL
RBC # FLD: 17.3 %
SODIUM SERPL-SCNC: 139 MMOL/L
TIBC SERPL-MCNC: 462 UG/DL
TSH SERPL-ACNC: 1.61 UIU/ML
TTG IGA SER IA-ACNC: 12.3 U/ML
TTG IGA SER-ACNC: POSITIVE
TTG IGG SER IA-ACNC: 7.3 U/ML
TTG IGG SER IA-ACNC: ABNORMAL
UIBC SERPL-MCNC: 433 UG/DL
VIT B12 SERPL-MCNC: 861 PG/ML
WBC # FLD AUTO: 10.8 K/UL

## 2021-08-09 ENCOUNTER — NON-APPOINTMENT (OUTPATIENT)
Age: 61
End: 2021-08-09

## 2021-08-09 LAB
ENDOMYSIUM IGA SER QL: POSITIVE
ENDOMYSIUM IGA TITR SER: ABNORMAL
VIT A SERPL-MCNC: 34.8 UG/DL

## 2021-08-10 LAB
VIT B6 SERPL-MCNC: 13.3 UG/L
ZINC SERPL-MCNC: 96 UG/DL

## 2021-08-11 LAB — MENADIONE SERPL-MCNC: 0.23 NG/ML

## 2021-08-12 ENCOUNTER — TRANSCRIPTION ENCOUNTER (OUTPATIENT)
Age: 61
End: 2021-08-12

## 2022-06-29 ENCOUNTER — NEW PATIENT (OUTPATIENT)
Dept: URBAN - METROPOLITAN AREA CLINIC 20 | Facility: CLINIC | Age: 62
End: 2022-06-29

## 2022-06-29 DIAGNOSIS — H52.13: ICD-10-CM

## 2022-06-29 PROCEDURE — 92015 DETERMINE REFRACTIVE STATE: CPT

## 2022-06-29 PROCEDURE — 92004 COMPRE OPH EXAM NEW PT 1/>: CPT

## 2022-06-29 ASSESSMENT — KERATOMETRY
OS_AXISANGLE2_DEGREES: 106
OD_AXISANGLE2_DEGREES: 87
OS_K1POWER_DIOPTERS: 44.75
OS_AXISANGLE_DEGREES: 16
OD_AXISANGLE_DEGREES: 177
OD_K1POWER_DIOPTERS: 44.75
OS_K2POWER_DIOPTERS: 45.75
OD_K2POWER_DIOPTERS: 46.00

## 2022-06-29 ASSESSMENT — TONOMETRY
OD_IOP_MMHG: 14
OS_IOP_MMHG: 14

## 2022-06-29 ASSESSMENT — VISUAL ACUITY
OD_CC: 20/30+2
OS_CC: 20/30+2
OU_CC: J1+

## 2023-07-11 ENCOUNTER — COMPREHENSIVE EXAM (OUTPATIENT)
Dept: URBAN - METROPOLITAN AREA CLINIC 20 | Facility: CLINIC | Age: 63
End: 2023-07-11

## 2023-07-11 DIAGNOSIS — H25.813: ICD-10-CM

## 2023-07-11 DIAGNOSIS — H47.323: ICD-10-CM

## 2023-07-11 DIAGNOSIS — H35.3131: ICD-10-CM

## 2023-07-11 DIAGNOSIS — H52.13: ICD-10-CM

## 2023-07-11 PROCEDURE — 92015 DETERMINE REFRACTIVE STATE: CPT

## 2023-07-11 PROCEDURE — 99214 OFFICE O/P EST MOD 30 MIN: CPT

## 2023-07-11 ASSESSMENT — KERATOMETRY
OD_K2POWER_DIOPTERS: 45.25
OD_AXISANGLE2_DEGREES: 92
OS_K1POWER_DIOPTERS: 45
OD_K1POWER_DIOPTERS: 44.5
OD_AXISANGLE_DEGREES: 2
OS_AXISANGLE_DEGREES: 42
OS_AXISANGLE2_DEGREES: 132
OS_K2POWER_DIOPTERS: 45.25

## 2023-07-11 ASSESSMENT — VISUAL ACUITY
OD_CC: 20/30
OU_CC: 20/20-1
OS_CC: 20/25-2

## 2023-07-11 ASSESSMENT — TONOMETRY
OS_IOP_MMHG: 17
OD_IOP_MMHG: 18

## 2023-10-01 PROBLEM — L98.9 SKIN DISORDER: Status: ACTIVE | Noted: 2018-06-18
